# Patient Record
Sex: FEMALE | Race: WHITE | Employment: UNEMPLOYED | RURAL
[De-identification: names, ages, dates, MRNs, and addresses within clinical notes are randomized per-mention and may not be internally consistent; named-entity substitution may affect disease eponyms.]

---

## 2017-01-20 ENCOUNTER — DOCUMENTATION ONLY (OUTPATIENT)
Dept: FAMILY MEDICINE CLINIC | Age: 57
End: 2017-01-20

## 2017-01-20 ENCOUNTER — OFFICE VISIT (OUTPATIENT)
Dept: FAMILY MEDICINE CLINIC | Age: 57
End: 2017-01-20

## 2017-01-20 ENCOUNTER — TELEPHONE (OUTPATIENT)
Dept: FAMILY MEDICINE CLINIC | Age: 57
End: 2017-01-20

## 2017-01-20 VITALS
WEIGHT: 240 LBS | DIASTOLIC BLOOD PRESSURE: 90 MMHG | BODY MASS INDEX: 39.99 KG/M2 | HEIGHT: 65 IN | RESPIRATION RATE: 16 BRPM | TEMPERATURE: 98.1 F | SYSTOLIC BLOOD PRESSURE: 140 MMHG | HEART RATE: 78 BPM | OXYGEN SATURATION: 97 %

## 2017-01-20 DIAGNOSIS — I10 ESSENTIAL HYPERTENSION: Primary | ICD-10-CM

## 2017-01-20 DIAGNOSIS — M79.675 PAIN AND SWELLING OF TOE, LEFT: ICD-10-CM

## 2017-01-20 DIAGNOSIS — M79.89 PAIN AND SWELLING OF TOE, LEFT: ICD-10-CM

## 2017-01-20 RX ORDER — TOBRAMYCIN 3 MG/ML
SOLUTION/ DROPS OPHTHALMIC
COMMUNITY
Start: 2017-01-05 | End: 2018-08-03

## 2017-01-20 RX ORDER — KETOROLAC TROMETHAMINE 5 MG/ML
SOLUTION OPHTHALMIC
COMMUNITY
Start: 2017-01-05 | End: 2018-08-03

## 2017-01-20 RX ORDER — TRAMADOL HYDROCHLORIDE 50 MG/1
TABLET ORAL
Qty: 60 TAB | Refills: 0 | OUTPATIENT
Start: 2017-01-20 | End: 2018-08-03

## 2017-01-20 RX ORDER — AMLODIPINE BESYLATE 5 MG/1
5 TABLET ORAL DAILY
Qty: 30 TAB | Refills: 5 | Status: SHIPPED | OUTPATIENT
Start: 2017-01-20 | End: 2017-06-26 | Stop reason: SDUPTHER

## 2017-01-20 RX ORDER — HYDROCODONE BITARTRATE AND ACETAMINOPHEN 5; 325 MG/1; MG/1
TABLET ORAL
COMMUNITY
Start: 2016-11-15 | End: 2017-01-20

## 2017-01-20 RX ORDER — FLUOCINONIDE 0.5 MG/G
OINTMENT TOPICAL
COMMUNITY
Start: 2017-01-18 | End: 2018-08-03

## 2017-01-20 RX ORDER — LISINOPRIL AND HYDROCHLOROTHIAZIDE 12.5; 2 MG/1; MG/1
1 TABLET ORAL DAILY
Qty: 30 TAB | Refills: 5 | Status: SHIPPED | OUTPATIENT
Start: 2017-01-20 | End: 2017-07-26 | Stop reason: SDUPTHER

## 2017-01-20 RX ORDER — BETAMETHASONE DIPROPIONATE 0.5 MG/G
OINTMENT TOPICAL
COMMUNITY
Start: 2016-10-19 | End: 2019-02-17

## 2017-01-20 RX ORDER — MELOXICAM 7.5 MG/1
TABLET ORAL
COMMUNITY
Start: 2016-10-13 | End: 2017-01-20

## 2017-01-20 RX ORDER — PREDNISOLONE ACETATE 10 MG/ML
SUSPENSION/ DROPS OPHTHALMIC
COMMUNITY
Start: 2017-01-05 | End: 2018-08-03

## 2017-01-20 NOTE — PROGRESS NOTES
Chief Complaint   Patient presents with    Toe Swelling     started noticing swelling of great toe on left foot 3 weeks ago. pt states she stumped it.  Medication Refill     out of Amlodipine and Lisinopril     \"REVIEWED RECORD IN PREPARATION FOR VISIT AND HAVE OBTAINED THE NECESSARY DOCUMENTATION\"  1. Have you been to the ER, urgent care clinic since your last visit? Hospitalized since your last visit? No    2. Have you seen or consulted any other health care providers outside of the [address] [place] [address] since your last visit? Include any pap smears or colon screening. No  Patient does not have advanced directives.

## 2017-01-20 NOTE — PROGRESS NOTES
Subjective:     Jossue Martinez is a 61 y.o. female who presents for follow up of hypertension and medication refill. She reports that she has been off her medicine for the last 10 days. Hypertension:   - Diet and Lifestyle: generally follows a low sodium diet, exercises sporadically, nonsmoker  - Home BP Monitoring: is not measured at home    Cardiovascular ROS: no TIA's, no chest pain on exertion, no dyspnea on exertion, no swelling of ankles, no orthostatic dizziness or lightheadedness, no orthopnea or paroxysmal nocturnal dyspnea. New concerns:   - Tripped over carpet and stumped her left great toe 3 weeks ago. Associated with steady throbbing pain and swelling. Current Outpatient Prescriptions   Medication Sig Dispense Refill    augmented betamethasone dipropionate (DIPROLENE-AF) 0.05 % ointment       fluocinoNIDE (LIDEX) 0.05 % ointment       ketorolac (ACULAR) 0.5 % ophthalmic solution       prednisoLONE acetate (PRED FORTE) 1 % ophthalmic suspension       tobramycin (TOBREX) 0.3 % ophthalmic solution       triamcinolone acetonide (KENALOG) 0.1 % topical cream       mupirocin (BACTROBAN) 2 % ointment       ketoconazole (NIZORAL) 2 % shampoo Apply to wet scalp, lather, leave on 3 to 5 minutes, and rinse. Indications: DANDRUFF 1 Bottle 2    albuterol (VENTOLIN HFA) 90 mcg/actuation inhaler Take 2 Puffs by inhalation as needed.  amLODIPine (NORVASC) 5 mg tablet Take 1 Tab by mouth daily. Appt needed for refills. Indications: Hypertension 30 Tab 0    lisinopril-hydroCHLOROthiazide (PRINZIDE, ZESTORETIC) 20-12.5 mg per tablet Take 1 Tab by mouth daily. Appt needed for refills.   Indications: Hypertension 30 Tab 0       No Known Allergies      Past Medical History   Diagnosis Date    Arthritis      shoulders    Asthma     Hypertension     Other ill-defined conditions(559.22)      gallbladder       Social History   Substance Use Topics    Smoking status: Former Smoker     Packs/day: 1.00     Years: 10.00     Quit date: 9/24/2014    Smokeless tobacco: Never Used      Comment: one cigarette per day when pt smokes    Alcohol use No        Review of Systems, additional:  Pertinent items are noted in HPI. Objective:     Visit Vitals    /90 (BP 1 Location: Left arm, BP Patient Position: Sitting)  Comment: out of medications    Pulse 78    Temp 98.1 °F (36.7 °C) (Oral)    Resp 16    Ht 5' 5\" (1.651 m)    Wt 240 lb (108.9 kg)    SpO2 97%    BMI 39.94 kg/m2     General appearance - alert, well appearing, and in no distress  Mental status - alert, oriented to person, place, and time, normal mood, behavior, speech, dress, motor activity, and thought processes  Eyes - pupils equal and reactive, extraocular eye movements intact, sclera anicteric  Neck - supple, no significant adenopathy  Chest - clear to auscultation, no wheezes, rales or rhonchi, symmetric air entry, no tachypnea, retractions or cyanosis  Heart - normal rate, regular rhythm, normal S1, S2, no murmurs, rubs, clicks or gallops  Neurological - alert, oriented, normal speech, no focal findings or movement disorder noted   Feet - left great toe swollen with tenderness, nailbed not examined due to nail polish, pain with movement of the left toe     Assessment/Plan:   Carolina Stallings is a 61 y.o. female seen today for:     1. Essential hypertension: okay today, continue with current therapy. Medications refilled. - amLODIPine (NORVASC) 5 mg tablet; Take 1 Tab by mouth daily. Indications: Hypertension  Dispense: 30 Tab; Refill: 5  - lisinopril-hydroCHLOROthiazide (PRINZIDE, ZESTORETIC) 20-12.5 mg per tablet; Take 1 Tab by mouth daily. Indications: Hypertension  Dispense: 30 Tab; Refill: 5    2. Pain and swelling of toe, left: s/p injury, will check XR for fracture. - XR GREAT TOE LT MIN 2 V; Future  - traMADol (ULTRAM) 50 mg tablet; Take 1-2 tablets daily as needed for pain. Dispense: 60 Tab;  Refill: 0    I have discussed the diagnosis with the patient and the intended plan as seen in the above orders. The patient has received an after-visit summary and questions were answered concerning future plans. I have discussed medication side effects and warnings with the patient as well. Patient verbalizes understanding of plan of care and denies further questions or concerns at this time. Informed patient to return to the office if symptoms worsen or if new symptoms arise. Follow-up Disposition:  Return in about 6 months (around 7/20/2017) for blood pressure check.

## 2017-01-20 NOTE — MR AVS SNAPSHOT
Visit Information Date & Time Provider Department Dept. Phone Encounter #  
 1/20/2017  9:30 AM Fozia PonceJose [phone] [id number] Follow-up Instructions Return in about 6 months (around 7/20/2017) for blood pressure check. Upcoming Health Maintenance Date Due Hepatitis C Screening 1960 DTaP/Tdap/Td series (1 - Tdap) 8/4/1980 FOBT Q 1 YEAR AGE 50-75 8/4/2009 BREAST CANCER SCRN MAMMOGRAM 1/13/2018 PAP AKA CERVICAL CYTOLOGY 1/13/2019 Allergies as of 1/20/2017  Review Complete On: 1/20/2017 By: Fozia Ponce MD  
 No Known Allergies Current Immunizations  Never Reviewed No immunizations on file. Not reviewed this visit You Were Diagnosed With   
  
 Codes Comments Essential hypertension    -  Primary ICD-10-CM: I10 
ICD-9-CM: 401.9 Pain and swelling of toe, left     ICD-10-CM: M79.662, M79.89 ICD-9-CM: 729.5 Vitals BP Pulse Temp Resp Height(growth percentile) Weight(growth percentile) 140/90 (BP 1 Location: Left arm, BP Patient Position: Sitting) 78 98.1 °F (36.7 °C) (Oral) 16 5' 5\" (1.651 m) 240 lb (108.9 kg) SpO2 BMI OB Status Smoking Status 97% 39.94 kg/m2 Postmenopausal Former Smoker Vitals History BMI and BSA Data Body Mass Index Body Surface Area  
 39.94 kg/m 2 2.23 m 2 [facility]erred [facility] [address] [facility], [facility] [phone] Your Updated Medication List  
  
   
This list is accurate as of: 1/20/17 10:07 AM.  Always use your most recent med list. amLODIPine 5 mg tablet Commonly known as:  Kalpesh Montalvo Take 1 Tab by mouth daily. Indications: Hypertension  
  
 augmented betamethasone dipropionate 0.05 % ointment Commonly known as:  DIPROLENE-AF  
  
 fluocinoNIDE 0.05 % ointment Commonly known as:  LIDEX  
  
 ketoconazole 2 % shampoo Commonly known as:  NIZORAL Apply to wet scalp, lather, leave on 3 to 5 minutes, and rinse. Indications: DANDRUFF  
  
 ketorolac 0.5 % ophthalmic solution Commonly known as:  ACULAR  
  
 lisinopril-hydroCHLOROthiazide 20-12.5 mg per tablet Commonly known as:  Song Decent Take 1 Tab by mouth daily. Indications: Hypertension  
  
 mupirocin 2 % ointment Commonly known as:  BACTROBAN  
  
 prednisoLONE acetate 1 % ophthalmic suspension Commonly known as:  PRED FORTE  
  
 tobramycin 0.3 % ophthalmic solution Commonly known as:  TOBREX  
  
 triamcinolone acetonide 0.1 % topical cream  
Commonly known as:  KENALOG  
  
 VENTOLIN HFA 90 mcg/actuation inhaler Generic drug:  albuterol Take 2 Puffs by inhalation as needed. Prescriptions Sent to Pharmacy Refills  
 amLODIPine (NORVASC) 5 mg tablet 5 Sig: Take 1 Tab by mouth daily. Indications: Hypertension Class: Normal  
 Pharmacy: [address] [place] [address], [address] [place] [address] Ph #: [phone] Route: Oral  
 lisinopril-hydroCHLOROthiazide (PRINZIDE, ZESTORETIC) 20-12.5 mg per tablet 5 Sig: Take 1 Tab by mouth daily. Indications: Hypertension Class: Normal  
 Pharmacy: [address] [place] [address], [address] [place] [address] Ph #: [phone] Route: Oral  
  
Follow-up Instructions Return in about 6 months (around 7/20/2017) for blood pressure check. To-Do List   
 01/20/2017 Imaging:  XR GREAT TOE LT MIN 2 V Patient Instructions A Healthy Lifestyle: Care Instructions Your Care Instructions A healthy lifestyle can help you feel good, stay at a healthy weight, and have plenty of energy for both work and play. A healthy lifestyle is something you can share with your whole family. A healthy lifestyle also can lower your risk for serious health problems, such as high blood pressure, heart disease, and diabetes. You can follow a few steps listed below to improve your health and the health of your family. Follow-up care is a key part of your treatment and safety. Be sure to make and go to all appointments, and call your doctor if you are having problems. Its also a good idea to know your test results and keep a list of the medicines you take. How can you care for yourself at home? · Do not eat too much sugar, fat, or fast foods. You can still have dessert and treats now and then. The goal is moderation. · Start small to improve your eating habits. Pay attention to portion sizes, drink less juice and soda pop, and eat more fruits and vegetables. ¨ Eat a healthy amount of food. A 3-ounce serving of meat, for example, is about the size of a deck of cards. Fill the rest of your plate with vegetables and whole grains. ¨ Limit the amount of soda and sports drinks you have every day. Drink more water when you are thirsty. ¨ Eat at least 5 servings of fruits and vegetables every day. It may seem like a lot, but it is not hard to reach this goal. A serving or helping is 1 piece of fruit, 1 cup of vegetables, or 2 cups of leafy, raw vegetables. Have an apple or some carrot sticks as an afternoon snack instead of a candy bar. Try to have fruits and/or vegetables at every meal. 
· Make exercise part of your daily routine. You may want to start with simple activities, such as walking, bicycling, or slow swimming. Try to be active 30 to 60 minutes every day. You do not need to do all 30 to 60 minutes all at once. For example, you can exercise 3 times a day for 10 or 20 minutes. Moderate exercise is safe for most people, but it is always a good idea to talk to your doctor before starting an exercise program. 
· Keep moving. Cesar Osborne the lawn, work in the garden, or [employer]. Take the stairs instead of the elevator at work. · If you smoke, quit.  People who smoke have an increased risk for heart attack, stroke, cancer, and other lung illnesses. Quitting is hard, but there are ways to boost your chance of quitting tobacco for good. ¨ Use nicotine gum, patches, or lozenges. ¨ Ask your doctor about stop-smoking programs and medicines. ¨ Keep trying. In addition to reducing your risk of diseases in the future, you will notice some benefits soon after you stop using tobacco. If you have shortness of breath or asthma symptoms, they will likely get better within a few weeks after you quit. · Limit how much alcohol you drink. Moderate amounts of alcohol (up to 2 drinks a day for men, 1 drink a day for women) are okay. But drinking too much can lead to liver problems, high blood pressure, and other health problems. Family health If you have a family, there are many things you can do together to improve your health. · Eat meals together as a family as often as possible. · Eat healthy foods. This includes fruits, vegetables, lean meats and dairy, and whole grains. · Include your family in your fitness plan. Most people think of activities such as jogging or tennis as the way to fitness, but there are many ways you and your family can be more active. Anything that makes you breathe hard and gets your heart pumping is exercise. Here are some tips: 
¨ Walk to do errands or to take your child to school or the bus. ¨ Go for a family bike ride after dinner instead of watching TV. Where can you learn more? Go to http://vivi-victor m.info/. Enter C336 in the search box to learn more about \"A Healthy Lifestyle: Care Instructions. \" Current as of: July 26, 2016 Content Version: 11.1 © 7395-5566 [employer]. Care instructions adapted under license by new test company (which disclaims liability or warranty for this information).  If you have questions about a medical condition or this instruction, always ask your healthcare professional. [employer] disclaims any warranty or liability for your use of this information. Introducing \[facility]\"" & HEALTH SERVICES! [employer] introduces "[employer] patient portal. Now you can access parts of your medical record, email your doctor's office, and request medication refills online. 1. In your internet browser, go to [email or web address] 2. Click on the First Time User? Click Here link in the Sign In box. You will see the New Member Sign Up page. 3. Enter your "[employer] Access Code exactly as it appears below. You will not need to use this code after youve completed the sign-up process. If you do not sign up before the expiration date, you must request a new code. · "[employer] Access Code: [email or web address] Expires: 4/20/2017 10:07 AM 
 
4. Enter the last four digits of your Social Security Number (xxxx) and Date of Birth (mm/dd/yyyy) as indicated and click Submit. You will be taken to the next sign-up page. 5. Create a "[employer] ID. This will be your "[employer] login ID and cannot be changed, so think of one that is secure and easy to remember. 6. Create a "[employer] password. You can change your password at any time. 7. Enter your Password Reset Question and Answer. This can be used at a later time if you forget your password. 8. Enter your e-mail address. You will receive e-mail notification when new information is available in [address] 9. Click Sign Up. You can now view and download portions of your medical record. 10. Click the Download Summary menu link to download a portable copy of your medical information. If you have questions, please visit the Frequently Asked Questions section of the "[employer] website. Remember, "[employer] is NOT to be used for urgent needs. For medical emergencies, dial 911. Now available from your iPhone and Android! Please provide this summary of care documentation to your next provider. Your primary care clinician is listed as Phys Other. If you have any questions after today's visit, please call [phone].

## 2017-01-20 NOTE — TELEPHONE ENCOUNTER
Called to pharmacist at [facility]    Tramadol 50mg tab    Take 1-2 tabs by mouth daily as needed. #60/0 per Dr. Torey Franco verbal order with read back. Pharmacist stated she will notify pt that Rx is ready.

## 2017-01-20 NOTE — TELEPHONE ENCOUNTER
Pt stated pharmacy is not seeing medications ordered this morning - [facility] - Caitlin CHAHAL

## 2017-01-20 NOTE — PATIENT INSTRUCTIONS

## 2017-07-06 ENCOUNTER — TELEPHONE (OUTPATIENT)
Dept: FAMILY MEDICINE CLINIC | Age: 57
End: 2017-07-06

## 2018-08-03 ENCOUNTER — HOSPITAL ENCOUNTER (EMERGENCY)
Age: 58
Discharge: HOME | End: 2018-08-03
Attending: EMERGENCY MEDICINE
Payer: MEDICARE

## 2018-08-03 VITALS
RESPIRATION RATE: 16 BRPM | DIASTOLIC BLOOD PRESSURE: 80 MMHG | HEART RATE: 92 BPM | OXYGEN SATURATION: 98 % | BODY MASS INDEX: 35.36 KG/M2 | TEMPERATURE: 98.2 F | HEIGHT: 66 IN | WEIGHT: 220 LBS | SYSTOLIC BLOOD PRESSURE: 168 MMHG
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
## 2018-08-03 DIAGNOSIS — S39.012A BACK STRAIN, INITIAL ENCOUNTER: ICD-10-CM

## 2018-08-03 PROCEDURE — 74011250637 HC RX REV CODE- 250/637: Performed by: EMERGENCY MEDICINE

## 2018-08-03 PROCEDURE — 99283 EMERGENCY DEPT VISIT LOW MDM: CPT

## 2018-08-03 RX ORDER — IBUPROFEN 800 MG/1
800 TABLET ORAL
Status: COMPLETED | OUTPATIENT
Start: 2018-08-03 | End: 2018-08-03

## 2018-08-03 RX ORDER — IBUPROFEN 800 MG/1
800 TABLET ORAL
Qty: 20 TAB | Refills: 0 | Status: SHIPPED | OUTPATIENT
Start: 2018-08-03 | End: 2018-08-10

## 2018-08-03 RX ADMIN — IBUPROFEN 800 MG: 800 TABLET ORAL at 19:07

## 2018-08-03 NOTE — ED NOTES
The patient was discharged home by Dr. Jocelynn Win and Viv Allen rn in stable condition, accompanied by family. The patient is alert and oriented, is in no respiratory distress and has vital signs within normal limits . The patient's diagnosis, condition and treatment were explained to patient. The patient expressed understanding. Prescriptions given to pt. No work/school note given to pt. A discharge plan has been developed. A  was not involved in the process. Aftercare instructions were given to the patient. Family will transport pt home.
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
## 2018-11-12 ENCOUNTER — HOSPITAL ENCOUNTER (OUTPATIENT)
Dept: LAB | Age: 58
Discharge: HOME | End: 2018-11-12
Payer: MEDICARE

## 2018-11-12 ENCOUNTER — OFFICE VISIT (OUTPATIENT)
Dept: FAMILY MEDICINE CLINIC | Age: 58
End: 2018-11-12

## 2018-11-12 VITALS
DIASTOLIC BLOOD PRESSURE: 101 MMHG | OXYGEN SATURATION: 99 % | HEIGHT: 66 IN | WEIGHT: 250 LBS | RESPIRATION RATE: 20 BRPM | HEART RATE: 92 BPM | BODY MASS INDEX: 40.18 KG/M2 | SYSTOLIC BLOOD PRESSURE: 141 MMHG | TEMPERATURE: 98.4 F

## 2018-11-12 DIAGNOSIS — M17.0 PRIMARY OSTEOARTHRITIS OF BOTH KNEES: ICD-10-CM

## 2018-11-12 DIAGNOSIS — Z29.9 ENCOUNTER FOR PREVENTIVE MEASURE: ICD-10-CM

## 2018-11-12 DIAGNOSIS — E66.01 MORBID OBESITY DUE TO EXCESS CALORIES (HCC): ICD-10-CM

## 2018-11-12 DIAGNOSIS — Z23 ENCOUNTER FOR IMMUNIZATION: ICD-10-CM

## 2018-11-12 DIAGNOSIS — Z83.3 FAMILY HISTORY OF DIABETES MELLITUS: ICD-10-CM

## 2018-11-12 DIAGNOSIS — I10 ESSENTIAL HYPERTENSION: Primary | ICD-10-CM

## 2018-11-12 PROCEDURE — 83036 HEMOGLOBIN GLYCOSYLATED A1C: CPT

## 2018-11-12 PROCEDURE — 80061 LIPID PANEL: CPT

## 2018-11-12 PROCEDURE — 36415 COLL VENOUS BLD VENIPUNCTURE: CPT

## 2018-11-12 PROCEDURE — 85025 COMPLETE CBC W/AUTO DIFF WBC: CPT

## 2018-11-12 PROCEDURE — 80053 COMPREHEN METABOLIC PANEL: CPT

## 2018-11-12 RX ORDER — LISINOPRIL AND HYDROCHLOROTHIAZIDE 12.5; 2 MG/1; MG/1
TABLET ORAL
Qty: 30 TAB | Refills: 5 | Status: SHIPPED | OUTPATIENT
Start: 2018-11-12 | End: 2019-03-15 | Stop reason: SDUPTHER

## 2018-11-12 RX ORDER — DICLOFENAC SODIUM 10 MG/G
2 GEL TOPICAL 4 TIMES DAILY
Qty: 1 EACH | Refills: 3 | Status: SHIPPED | OUTPATIENT
Start: 2018-11-12 | End: 2019-03-15 | Stop reason: SDUPTHER

## 2018-11-12 RX ORDER — MELOXICAM 15 MG/1
15 TABLET ORAL DAILY
Qty: 30 TAB | Refills: 3 | Status: SHIPPED | OUTPATIENT
Start: 2018-11-12

## 2018-11-12 NOTE — LETTER
11/13/2018 1:16 PM 
 
[facility]. [id number] [facility] [address]  [id number] [place] [id number] [facility] [address]: 
 
Please find your most recent results below. Resulted Orders CBC WITH AUTOMATED DIFF Result Value Ref Range WBC 6.4 3.4 - 10.8 x10E3/uL  
 RBC 5.38 (H) 3.77 - 5.28 x10E6/uL HGB 11.7 11.1 - 15.9 g/dL HCT 37.7 34.0 - 46.6 % MCV 70 (L) 79 - 97 fL  
 MCH 21.7 (L) 26.6 - 33.0 pg  
 MCHC 31.0 (L) 31.5 - 35.7 g/dL  
 RDW 18.3 (H) 12.3 - 15.4 % PLATELET 677 653 - 256 x10E3/uL NEUTROPHILS 65 Not Estab. % Lymphocytes 23 Not Estab. % MONOCYTES 9 Not Estab. % EOSINOPHILS 2 Not Estab. % BASOPHILS 1 Not Estab. %  
 ABS. NEUTROPHILS 4.2 1.4 - 7.0 x10E3/uL Abs Lymphocytes 1.5 0.7 - 3.1 x10E3/uL  
 ABS. MONOCYTES 0.6 0.1 - 0.9 x10E3/uL  
 ABS. EOSINOPHILS 0.1 0.0 - 0.4 x10E3/uL  
 ABS. BASOPHILS 0.0 0.0 - 0.2 x10E3/uL IMMATURE GRANULOCYTES 0 Not Estab. %  
 ABS. IMM. GRANS. 0.0 0.0 - 0.1 x10E3/uL Narrative Performed at:  [address]  [id number] : Patricia Lindsay MD, Phone:  [phone] LIPID PANEL Result Value Ref Range Cholesterol, total 139 100 - 199 mg/dL Triglyceride 103 0 - 149 mg/dL HDL Cholesterol 48 >39 mg/dL VLDL, calculated 21 5 - 40 mg/dL LDL, calculated 70 0 - 99 mg/dL Narrative Performed at:  [address]  [id number] : Patricia Lindsay MD, Phone:  [phone] METABOLIC PANEL, COMPREHENSIVE Result Value Ref Range Glucose 106 (H) 65 - 99 mg/dL BUN 12 6 - 24 mg/dL Creatinine 0.93 0.57 - 1.00 mg/dL GFR est non-AA 67 >59 mL/min/1.73 GFR est AA 78 >59 mL/min/1.73  
 BUN/Creatinine ratio 13 9 - 23 Sodium 142 134 - 144 mmol/L Potassium 4.3 3.5 - 5.2 mmol/L Chloride 101 96 - 106 mmol/L  
 CO2 22 20 - 29 mmol/L Calcium 9.3 8.7 - 10.2 mg/dL Protein, total 6.6 6.0 - 8.5 g/dL Albumin 4.4 3.5 - 5.5 g/dL GLOBULIN, TOTAL 2.2 1.5 - 4.5 g/dL A-G Ratio 2.0 1.2 - 2.2 Bilirubin, total 0.2 0.0 - 1.2 mg/dL Alk. phosphatase 78 39 - 117 IU/L  
 AST (SGOT) 23 0 - 40 IU/L  
 ALT (SGPT) 19 0 - 32 IU/L Narrative Performed at:  [address]  [id number] : Will Alex MD, Phone:  [phone] HEMOGLOBIN A1C WITH EAG Result Value Ref Range Hemoglobin A1c 6.0 (H) 4.8 - 5.6 % Comment:  
            Prediabetes: 5.7 - 6.4 Diabetes: >6.4 Glycemic control for adults with diabetes: <7.0 Estimated average glucose 126 mg/dL Narrative Performed at:  [address]  [id number] : Will Alex MD, Phone:  [phone] CVD REPORT Result Value Ref Range INTERPRETATION Note Comment:  
   Supplemental report is available. Narrative Performed at:  [address] [place] [address]  [id number] : Sis Chiu MD, Phone:  [phone] RECOMMENDATIONS: 
Your labwork looks good, except your A1c (used to screen for diabetes) is a little elevated. It is 6.0, which means you have PRE-DIABETES. So, I recommend working on healthy eating habits as you have started already, with a diet low in carbohydrates and sugar. Also, exercise at least 30min for 5 days per week. Otherwise, keep up the good work and we'll see you at your next visit! Please call me if you have any questions: [phone] Sincerely, 
 
 
Reyna Mendez MD

## 2018-11-12 NOTE — PROGRESS NOTES
[address]    Subjective:   Didi Cannon is a 60 y.o. female with history of HTN, sarcoidosis of skin in remission  CC: knee pain and med refills  History provided by patient     HPI:    Patient is new to this clinic, but used to see Dr. Jorje Davis at [facility]. Last appointment was almost 2 years ago. HTN  Patient presents to clinic for f/u on chronic conditions. She has a hx of HTN and used to be on Prinzide and Amlodipine. However, her father  a couple of years ago and while going through this tragedy, she stopped taking all her medications. She is also not checking her BP at home. She endorses occasional H/A and believes they're due to uncontrolled high blood pressure. Otherwise, pt denies fever, chills, chest pain, palpitations, N/V, abdominal pain, or dysuria. OA of knees  Patient with b/l knee OA which causes moderate to severe pain. Achy pain is very bothersome in the evenings. \"it's like a toothache at night,\" she declares. She reports trying Tylenol PM and other OTC pain medications in the past, without much relief. She states that a couple of years back, she was placed on Tramadol, which seems to be effective. She has not seen a PCP in over a year now, and has occasionally been using her uncle's Tramadol pills. Moreover, she sees orthopedics, Dr. Nisha Chiu, and received gel intraarticular injection every 3 months (last injection ~ 3 months ago). Obesity   Patient has been struggling with weight loss these past few years. She reports that at some point, she succeeded in losing some weight, but after the death of her father she has been gaining a lot more. A couple of weeks ago, she decided to start Weight Watchers programs. She affirms that it is a lot harder to exercise due to joint pain. Social Hx  Patient currently does not smoke, but past smoker with 10 year pack history. She denies alcohol or illicit drug use.      Preventive measure  Mammogram: last one in 2016, denies abnormal result in the past.  Colonoscopy: Never had one  PAP smear: Denies abnormal results in the past. She is due for one. DTaP: She has not had the vaccine yet. Family hx of diabetes   DM2: Father   HTN: Mother  Cancer: Father and paternal brother and grand father with colon cancer  CVA: maternal relatives    Current Outpatient Medications on File Prior to Visit   Medication Sig Dispense Refill    augmented betamethasone dipropionate (DIPROLENE-AF) 0.05 % ointment       ketoconazole (NIZORAL) 2 % shampoo Apply to wet scalp, lather, leave on 3 to 5 minutes, and rinse. Indications: DANDRUFF 1 Bottle 2     No current facility-administered medications on file prior to visit. Patient Active Problem List   Diagnosis Code    S/P laparoscopic cholecystectomy Z90.49    Essential hypertension I10    Sarcoidosis of skin D86.3       Social History     Socioeconomic History    Marital status: UNKNOWN     Spouse name: Not on file    Number of children: Not on file    Years of education: Not on file    Highest education level: Not on file   Social Needs    Financial resource strain: Not on file    Food insecurity - worry: Not on file    Food insecurity - inability: Not on file    Transportation needs - medical: Not on file   [employer] needs - non-medical: Not on file   Occupational History    Not on file   Tobacco Use    Smoking status: Former Smoker     Packs/day: 1.00     Years: 10.00     Pack years: 10.00     Last attempt to quit: 2014     Years since quittin.1    Smokeless tobacco: Never Used    Tobacco comment: one cigarette per day when pt smokes   Substance and Sexual Activity    Alcohol use: No     Alcohol/week: 0.0 oz    Drug use: No    Sexual activity: Not on file   Other Topics Concern    Not on file   Social History Narrative    Not on file       Review of Systems   Constitutional: Negative for chills and fever.    HENT: Negative for congestion. Respiratory: Negative for cough. Cardiovascular: Negative for chest pain and palpitations. Gastrointestinal: Negative for abdominal pain, diarrhea, nausea and vomiting. Genitourinary: Negative for dysuria. Musculoskeletal: Positive for joint pain (knees). Negative for falls. Skin: Negative for rash. Neurological: Positive for headaches. Negative for dizziness. Psychiatric/Behavioral: Negative for substance abuse. Objective:     Visit Vitals  BP (!) 141/101 (BP 1 Location: Left arm, BP Patient Position: Sitting)   Pulse 92   Temp 98.4 °F (36.9 °C) (Oral)   Resp 20   Ht 5' 6\" (1.676 m)   Wt 250 lb (113.4 kg)   SpO2 99%   BMI 40.35 kg/m²        Physical Exam   Constitutional: She is oriented to person, place, and time. Pleasant, obese, in NAD   HENT:   Head: Normocephalic and atraumatic. Eyes: Conjunctivae and EOM are normal. Pupils are equal, round, and reactive to light. Neck: Normal range of motion. Cardiovascular: Normal rate, regular rhythm and normal heart sounds. No murmur heard. Pulmonary/Chest: Effort normal and breath sounds normal. No respiratory distress. She has no wheezes. Abdominal: Soft. Bowel sounds are normal. She exhibits no distension. There is no tenderness. Musculoskeletal: Normal range of motion. She exhibits no edema. Neurological: She is alert and oriented to person, place, and time. Skin: Skin is warm. She is not diaphoretic. Psychiatric: She has a normal mood and affect. Her behavior is normal. Thought content normal.       Pertinent Labs/Studies: N/A      Assessment and orders:     Pt is 57yro F who presents for management of HTN and knee pain    ICD-10-CM ICD-9-CM    1. Essential hypertension J83 090.6 METABOLIC PANEL, COMPREHENSIVE      lisinopril-hydroCHLOROthiazide (PRINZIDE, ZESTORETIC) 20-12.5 mg per tablet   2.  Primary osteoarthritis of both knees M17.0 715.16 diclofenac (VOLTAREN) 1 % gel      meloxicam (MOBIC) 15 mg tablet 3. Encounter for immunization Z23 V03.89 INFLUENZA VIRUS VAC QUAD,SPLIT,PRESV FREE SYRINGE IM   4. Family history of diabetes mellitus Z83.3 V18.0 HEMOGLOBIN A1C WITH EAG   5. Encounter for preventive measure Z29.9 V07.9 CBC WITH AUTOMATED DIFF      LIPID PANEL   6. Morbid obesity due to excess calories ([facility]) E66.01 278.01 LIPID PANEL     Diagnoses and all orders for this visit:    1. Essential hypertension  Patient used to be on Prinzide and Amlodipine a couple of years ago, but has not seen PCP since then and stopped medications. Will restart Prinzide 20 daily and f/u in 2 weeks for BP recheck. Recommended low salt diet and regular BP check at home, once a day. -     METABOLIC PANEL, COMPREHENSIVE  -     lisinopril-hydroCHLOROthiazide (PRINZIDE, ZESTORETIC) 20-12.5 mg per tablet; TAKE ONE TABLET BY MOUTH EVERY DAY FOR HYPERTENSION    2. Primary osteoarthritis of both knees  Will start Mobic and apply Voltaren cream on affected areas. Consider Celebrex(need Prior Auth) if Mobix ineffective. -     diclofenac (VOLTAREN) 1 % gel; Apply 2 g to affected area four (4) times daily. -     meloxicam (MOBIC) 15 mg tablet; Take 1 Tab by mouth daily. 3. Encounter for immunization  -     INFLUENZA VIRUS VAC QUAD,SPLIT,PRESV FREE SYRINGE IM    4. Family history of diabetes mellitus  -     HEMOGLOBIN A1C WITH EAG    5. Encounter for preventive measure  Will work on [employer] and preventive measure such as mammogram, colonoscopy, etc... at next visit. Will also f/u on labwork. -     CBC WITH AUTOMATED DIFF  -     LIPID PANEL    6. Morbid obesity due to excess calories [facility])  Patient is currently working on weight loss. Encouraged diet low in carbohydrates and moderate exercises of 30min 5days/week. -     LIPID PANEL      Follow-up Disposition:  Return in about 2 weeks (around 11/26/2018) for follow-up, Medicare wellness, and preventive measure.     I have reviewed patient medical and social history and medications. I have reviewed pertinent labs results and other data. I have discussed the diagnosis with the patient and the intended plan as seen in the above orders. The patient has received an after-visit summary and questions were answered concerning future plans. I have discussed medication side effects and warnings with the patient as well.     Walter Donovan MD  Resident [facility]  11/12/18

## 2018-11-13 LAB
ALBUMIN SERPL-MCNC: 4.4 G/DL (ref 3.5–5.5)
ALBUMIN/GLOB SERPL: 2 {RATIO} (ref 1.2–2.2)
ALP SERPL-CCNC: 78 IU/L (ref 39–117)
ALT SERPL-CCNC: 19 IU/L (ref 0–32)
AST SERPL-CCNC: 23 IU/L (ref 0–40)
BASOPHILS # BLD AUTO: 0 X10E3/UL (ref 0–0.2)
BASOPHILS NFR BLD AUTO: 1 %
BILIRUB SERPL-MCNC: 0.2 MG/DL (ref 0–1.2)
BUN SERPL-MCNC: 12 MG/DL (ref 6–24)
BUN/CREAT SERPL: 13 (ref 9–23)
CALCIUM SERPL-MCNC: 9.3 MG/DL (ref 8.7–10.2)
CHLORIDE SERPL-SCNC: 101 MMOL/L (ref 96–106)
CHOLEST SERPL-MCNC: 139 MG/DL (ref 100–199)
CO2 SERPL-SCNC: 22 MMOL/L (ref 20–29)
CREAT SERPL-MCNC: 0.93 MG/DL (ref 0.57–1)
EOSINOPHIL # BLD AUTO: 0.1 X10E3/UL (ref 0–0.4)
EOSINOPHIL NFR BLD AUTO: 2 %
ERYTHROCYTE [DISTWIDTH] IN BLOOD BY AUTOMATED COUNT: 18.3 % (ref 12.3–15.4)
EST. AVERAGE GLUCOSE BLD GHB EST-MCNC: 126 MG/DL
GLOBULIN SER CALC-MCNC: 2.2 G/DL (ref 1.5–4.5)
GLUCOSE SERPL-MCNC: 106 MG/DL (ref 65–99)
HBA1C MFR BLD: 6 % (ref 4.8–5.6)
HCT VFR BLD AUTO: 37.7 % (ref 34–46.6)
HDLC SERPL-MCNC: 48 MG/DL
HGB BLD-MCNC: 11.7 G/DL (ref 11.1–15.9)
IMM GRANULOCYTES # BLD: 0 X10E3/UL (ref 0–0.1)
IMM GRANULOCYTES NFR BLD: 0 %
INTERPRETATION, 910389: NORMAL
LDLC SERPL CALC-MCNC: 70 MG/DL (ref 0–99)
LYMPHOCYTES # BLD AUTO: 1.5 X10E3/UL (ref 0.7–3.1)
LYMPHOCYTES NFR BLD AUTO: 23 %
MCH RBC QN AUTO: 21.7 PG (ref 26.6–33)
MCHC RBC AUTO-ENTMCNC: 31 G/DL (ref 31.5–35.7)
MCV RBC AUTO: 70 FL (ref 79–97)
MONOCYTES # BLD AUTO: 0.6 X10E3/UL (ref 0.1–0.9)
MONOCYTES NFR BLD AUTO: 9 %
NEUTROPHILS # BLD AUTO: 4.2 X10E3/UL (ref 1.4–7)
NEUTROPHILS NFR BLD AUTO: 65 %
PLATELET # BLD AUTO: 274 X10E3/UL (ref 150–379)
POTASSIUM SERPL-SCNC: 4.3 MMOL/L (ref 3.5–5.2)
PROT SERPL-MCNC: 6.6 G/DL (ref 6–8.5)
RBC # BLD AUTO: 5.38 X10E6/UL (ref 3.77–5.28)
SODIUM SERPL-SCNC: 142 MMOL/L (ref 134–144)
TRIGL SERPL-MCNC: 103 MG/DL (ref 0–149)
VLDLC SERPL CALC-MCNC: 21 MG/DL (ref 5–40)
WBC # BLD AUTO: 6.4 X10E3/UL (ref 3.4–10.8)

## 2018-11-13 NOTE — PROGRESS NOTES
Patient is pre-diabetic with A1c at 6.0, but otherwise unremarkable labwork. Will send result letter.

## 2018-11-26 ENCOUNTER — OFFICE VISIT (OUTPATIENT)
Dept: FAMILY MEDICINE CLINIC | Age: 58
End: 2018-11-26

## 2018-11-26 VITALS
TEMPERATURE: 98.2 F | BODY MASS INDEX: 40.35 KG/M2 | DIASTOLIC BLOOD PRESSURE: 119 MMHG | RESPIRATION RATE: 20 BRPM | SYSTOLIC BLOOD PRESSURE: 145 MMHG | HEART RATE: 77 BPM | OXYGEN SATURATION: 99 % | HEIGHT: 66 IN

## 2018-11-26 DIAGNOSIS — I10 ESSENTIAL HYPERTENSION: Primary | ICD-10-CM

## 2018-11-26 DIAGNOSIS — M19.072 OSTEOARTHRITIS OF BOTH ANKLES, UNSPECIFIED OSTEOARTHRITIS TYPE: ICD-10-CM

## 2018-11-26 DIAGNOSIS — E66.01 MORBID (SEVERE) OBESITY DUE TO EXCESS CALORIES (HCC): ICD-10-CM

## 2018-11-26 DIAGNOSIS — M19.071 OSTEOARTHRITIS OF BOTH ANKLES, UNSPECIFIED OSTEOARTHRITIS TYPE: ICD-10-CM

## 2018-11-26 RX ORDER — TRAMADOL HYDROCHLORIDE 50 MG/1
50 TABLET ORAL
Qty: 10 TAB | Refills: 0 | Status: SHIPPED | OUTPATIENT
Start: 2018-11-26 | End: 2018-12-17

## 2018-11-26 RX ORDER — AMLODIPINE BESYLATE 5 MG/1
5 TABLET ORAL DAILY
Qty: 30 TAB | Refills: 3 | Status: SHIPPED | OUTPATIENT
Start: 2018-11-26 | End: 2019-03-15 | Stop reason: SDUPTHER

## 2018-11-26 NOTE — PROGRESS NOTES
Chief Complaint   Patient presents with    Hypertension     follow up on BP     1. Have you been to the ER, urgent care clinic since your last visit? Hospitalized since your last visit? No    2. Have you seen or consulted any other health care providers outside of the [address] [place] [address] since your last visit? Include any pap smears or colon screening. No     Reviewed record in preparation for visit and have obtained necessary documentation.

## 2018-11-26 NOTE — PROGRESS NOTES
[address]    Subjective:   Didi Cannon is a 60 y.o. female with history of HTN, Knee OA, sarcoidosis of skin in remission  CC: F/u on HTN  History provided by patient     HPI:    HTN  Patient presents to clinic for f/u on BP. She used to be on Amlodipine and Prinzide but stopped Rx about a year ago, due to stressful life events. When she came to office a couple of weeks ago, she was restarted on Prinzide 20 daily only and asked to check BP daily at home. Today, patient reports compliance to medication but states that she does not check her BP at home and prefers to do it when she comes to clinic. No side effects to medication noted. OA  Patient endorses b/l lower extremities, including knees and ankle pain, reported at \"11/10\" on pain scale today. She was started on Mobic 15mg daily a couple of weeks ago, but patient states medication is not working and has been taking it 2-3 times a day without any significant relief. She states that so far, only Tramadol has considerably helped her. She does not see a pain specialist, but is being followed by an orthopedics, Dr. Dwayne Evans, who gives her knee intraarticular injections every 6 months(last injection ~ 3 months ago). Obesity  She has been walking and working out almost everyday now. Her cousin is a  and they've been exercising together (sessions last ~ 3 hours) 6days/week. Moreover, she is going through [employer] and reports losing 5lbs already. Current Outpatient Medications on File Prior to Visit   Medication Sig Dispense Refill    lisinopril-hydroCHLOROthiazide (PRINZIDE, ZESTORETIC) 20-12.5 mg per tablet TAKE ONE TABLET BY MOUTH EVERY DAY FOR HYPERTENSION 30 Tab 5    diclofenac (VOLTAREN) 1 % gel Apply 2 g to affected area four (4) times daily. 1 Each 3    meloxicam (MOBIC) 15 mg tablet Take 1 Tab by mouth daily.  30 Tab 3    augmented betamethasone dipropionate (DIPROLENE-AF) 0.05 % ointment       ketoconazole (NIZORAL) 2 % shampoo Apply to wet scalp, lather, leave on 3 to 5 minutes, and rinse. Indications: DANDRUFF 1 Bottle 2     No current facility-administered medications on file prior to visit. Patient Active Problem List   Diagnosis Code    S/P laparoscopic cholecystectomy Z90.49    Essential hypertension I10    Sarcoidosis of skin D86.3       Social History     Socioeconomic History    Marital status: UNKNOWN     Spouse name: Not on file    Number of children: Not on file    Years of education: Not on file    Highest education level: Not on file   Social Needs    Financial resource strain: Not on file    Food insecurity - worry: Not on file    Food insecurity - inability: Not on file    Transportation needs - medical: Not on file   [employer] needs - non-medical: Not on file   Occupational History    Not on file   Tobacco Use    Smoking status: Former Smoker     Packs/day: 1.00     Years: 10.00     Pack years: 10.00     Last attempt to quit: 2014     Years since quittin.1    Smokeless tobacco: Never Used    Tobacco comment: one cigarette per day when pt smokes   Substance and Sexual Activity    Alcohol use: No     Alcohol/week: 0.0 oz    Drug use: No    Sexual activity: Not on file   Other Topics Concern    Not on file   Social History Narrative    Not on file       Review of Systems   Constitutional: Negative for chills and fever. HENT: Negative for congestion. Eyes: Negative for blurred vision. Respiratory: Negative for cough. Cardiovascular: Negative for chest pain. Gastrointestinal: Negative for abdominal pain, diarrhea and vomiting. Genitourinary: Negative for dysuria. Musculoskeletal: Positive for joint pain. Neurological: Negative for dizziness and headaches. Psychiatric/Behavioral: Negative for depression.          Objective:     Visit Vitals  BP (!) 145/119 (BP 1 Location: Left arm, BP Patient Position: Sitting)   Pulse 77   Temp 98.2 °F (36.8 °C) (Oral)   Resp 20   Ht 5' 6\" (1.676 m)   SpO2 99%   BMI 40.35 kg/m²        Physical Exam   Constitutional: She is oriented to person, place, and time. Morbidly obese, appears to be in mild pain but in NAD   HENT:   Head: Normocephalic and atraumatic. Neck: Normal range of motion. Cardiovascular: Normal rate, regular rhythm and normal heart sounds. Pulmonary/Chest: Effort normal and breath sounds normal. No respiratory distress. Abdominal: Soft. Bowel sounds are normal. She exhibits no distension. Musculoskeletal: Normal range of motion. She exhibits tenderness (with flexion and extension of b/l knees and ankles). She exhibits no edema. Neurological: She is alert and oriented to person, place, and time. Skin: Skin is warm. Psychiatric: She has a normal mood and affect. Her behavior is normal. Thought content normal.       Pertinent Labs/Studies: N/A      Assessment and orders:     Pt is 57yro F who presents for follow-up on chronic conditions. ICD-10-CM ICD-9-CM    1. Essential hypertension I10 401.9 amLODIPine (NORVASC) 5 mg tablet   2. Osteoarthritis of both ankles, unspecified osteoarthritis type M19.071 715.97 traMADol (ULTRAM) 50 mg tablet    M19.072     3. Morbid (severe) obesity due to excess calories ([facility] [address] E66.01 278.01      Diagnoses and all orders for this visit:    1. Essential hypertension  C/o Prinzide daily and restart Norvasc 5mg daily. Will recheck at follow-up appointment in 2 weeks. -     amLODIPine (NORVASC) 5 mg tablet; Take 1 Tab by mouth daily. 2. Osteoarthritis of both ankles, unspecified osteoarthritis type  Patient reports severe pain, not alleviated by OTC or Mobic. She has been on Tramadol in the past and reports significant improvement with medication.  reviewed and appropriate. Will refer to pain specialist, and give few Tramadol pills while setting appointment.  Explained to patient that we can't do long-term pain management with opioids in clinic, and she expressed understanding. Also advised to follow-up with Orthopedics. -     traMADol (ULTRAM) 50 mg tablet; Take 1 Tab by mouth every six (6) hours as needed for Pain. Max Daily Amount: 200 mg.    3. Morbid (severe) obesity due to excess calories ([address]  Encouraged patient on weight loss journey, counseled on regular exercise and healthy diet. Follow-up Disposition:  Return in about 2 weeks (around 12/10/2018) for follow-up. I have reviewed patient medical and social history and medications. I have reviewed pertinent labs results and other data. I have discussed the diagnosis with the patient and the intended plan as seen in the above orders. The patient has received an after-visit summary and questions were answered concerning future plans. I have discussed medication side effects and warnings with the patient as well.     Yanely Isidro MD  Resident [facility]  11/26/18

## 2018-11-26 NOTE — PATIENT INSTRUCTIONS
[facility]    Address: [phone], [place], [address]  Phone: [phone]

## 2018-12-17 ENCOUNTER — OFFICE VISIT (OUTPATIENT)
Dept: FAMILY MEDICINE CLINIC | Age: 58
End: 2018-12-17

## 2018-12-17 DIAGNOSIS — I10 ESSENTIAL HYPERTENSION: Primary | ICD-10-CM

## 2018-12-17 DIAGNOSIS — E66.01 MORBID (SEVERE) OBESITY DUE TO EXCESS CALORIES (HCC): ICD-10-CM

## 2018-12-17 DIAGNOSIS — M17.0 PRIMARY OSTEOARTHRITIS OF BOTH KNEES: ICD-10-CM

## 2018-12-17 NOTE — PROGRESS NOTES
[address]    Subjective:   Orlin Higgins is a 60 y.o. female with history of HTN, Knee OA, sarcoidosis of skin in remission  CC: F/u on HTN  History provided by patient     HPI:    Patient presents to clinic for follow-up on BP. She is currently on Prinzide 10-12.5mg daily in addition to Norvasc 5mg daily, which was restarted a couple of weeks ago. Patient reports compliance to medications and denies any side effects. She doesn't check BP at home. She currently denies H/A, dizziness, chest pain, palpitations, N/V, abdominal pain, diarrhea or dysuria. However, she endorses chronic knee pain. No other complaint at this visit. Current Outpatient Medications on File Prior to Visit   Medication Sig Dispense Refill    amLODIPine (NORVASC) 5 mg tablet Take 1 Tab by mouth daily. 30 Tab 3    lisinopril-hydroCHLOROthiazide (PRINZIDE, ZESTORETIC) 20-12.5 mg per tablet TAKE ONE TABLET BY MOUTH EVERY DAY FOR HYPERTENSION 30 Tab 5    diclofenac (VOLTAREN) 1 % gel Apply 2 g to affected area four (4) times daily. 1 Each 3    meloxicam (MOBIC) 15 mg tablet Take 1 Tab by mouth daily. 30 Tab 3    augmented betamethasone dipropionate (DIPROLENE-AF) 0.05 % ointment       ketoconazole (NIZORAL) 2 % shampoo Apply to wet scalp, lather, leave on 3 to 5 minutes, and rinse. Indications: DANDRUFF 1 Bottle 2     No current facility-administered medications on file prior to visit.         Patient Active Problem List   Diagnosis Code    S/P laparoscopic cholecystectomy Z90.49    Essential hypertension I10    Sarcoidosis of skin D86.3    Morbid (severe) obesity due to excess calories ([facility] [address] E66.01       Social History     Socioeconomic History    Marital status: UNKNOWN     Spouse name: Not on file    Number of children: Not on file    Years of education: Not on file    Highest education level: Not on file   Social Needs    Financial resource strain: Not on file    Food insecurity - worry: Not on file   [facility] Food insecurity - inability: Not on file    Transportation needs - medical: Not on file   [employer] needs - non-medical: Not on file   Occupational History    Not on file   Tobacco Use    Smoking status: Former Smoker     Packs/day: 1.00     Years: 10.00     Pack years: 10.00     Last attempt to quit: 2014     Years since quittin.2    Smokeless tobacco: Never Used    Tobacco comment: one cigarette per day when pt smokes   Substance and Sexual Activity    Alcohol use: No     Alcohol/week: 0.0 oz    Drug use: No    Sexual activity: Not on file   Other Topics Concern    Not on file   Social History Narrative    Not on file       Review of Systems   Constitutional: Negative for chills and fever. HENT: Negative for congestion. Respiratory: Negative for cough. Cardiovascular: Negative for chest pain and palpitations. Gastrointestinal: Negative for abdominal pain, diarrhea and vomiting. Genitourinary: Negative for dysuria. Musculoskeletal: Positive for joint pain (b/l knee pain). Neurological: Negative for dizziness and headaches. Psychiatric/Behavioral: Negative for depression. Objective:     Visit Vitals  /80 (BP 1 Location: Right arm, BP Patient Position: Sitting)   Pulse 85   Temp 98.7 °F (37.1 °C) (Oral)   Resp 20   Ht 5' 6\" (1.676 m)   Wt 240 lb (108.9 kg)   SpO2 98%   BMI 38.74 kg/m²        Physical Exam   Constitutional: She is oriented to person, place, and time. Obese, pleasant, in NAD   Cardiovascular: Normal rate, regular rhythm and normal heart sounds. No murmur heard. Pulmonary/Chest: Effort normal and breath sounds normal. No respiratory distress. Abdominal: Soft. Bowel sounds are normal. She exhibits no distension. Musculoskeletal: Normal range of motion. She exhibits no edema. Neurological: She is alert and oriented to person, place, and time. Pertinent Labs/Studies: N/A      Assessment and orders:       ICD-10-CM ICD-9-CM    1. Essential hypertension I10 401.9    2. Primary osteoarthritis of both knees M17.0 715.16    3. Morbid (severe) obesity due to excess calories ([facility] [address] E66.01 278.01      Diagnoses and all orders for this visit:    1. Essential hypertension  BP stable at this visit. Continue home Prinzide and Norvasc at current dose. 2. Primary osteoarthritis of both knees  Patient reports making appointment with pain specialist in March, but will try to get sooner date at different office. Counseled on using Voltaren gel prn, as well as Mobic daily for now. She expressed understanding. 3. Morbid (severe) obesity due to excess calories [facility]  Patient is currently working on weight loss with decreasing carbohydrate and fat intake. She has lost 10lbs this past month per chart review. Will continue to monitor at next visit. Follow-up Disposition:  Return in about 3 months (around 3/17/2019) for follow-up, BP check. I have reviewed patient medical and social history and medications. I have reviewed pertinent labs results and other data. I have discussed the diagnosis with the patient and the intended plan as seen in the above orders. The patient has received an after-visit summary and questions were answered concerning future plans. I have discussed medication side effects and warnings with the patient as well.     Stanton Mulligan MD  Resident [facility]  12/20/18

## 2018-12-19 VITALS
DIASTOLIC BLOOD PRESSURE: 80 MMHG | RESPIRATION RATE: 20 BRPM | TEMPERATURE: 98.7 F | HEART RATE: 85 BPM | HEIGHT: 66 IN | SYSTOLIC BLOOD PRESSURE: 125 MMHG | WEIGHT: 240 LBS | BODY MASS INDEX: 38.57 KG/M2 | OXYGEN SATURATION: 98 %

## 2019-02-17 ENCOUNTER — HOSPITAL ENCOUNTER (EMERGENCY)
Age: 59
Discharge: HOME | End: 2019-02-17
Attending: EMERGENCY MEDICINE
Payer: MEDICARE

## 2019-02-17 VITALS
HEART RATE: 105 BPM | RESPIRATION RATE: 18 BRPM | OXYGEN SATURATION: 98 % | DIASTOLIC BLOOD PRESSURE: 73 MMHG | TEMPERATURE: 99.8 F | SYSTOLIC BLOOD PRESSURE: 134 MMHG

## 2019-02-17 DIAGNOSIS — J11.1 INFLUENZA-LIKE ILLNESS: Primary | ICD-10-CM

## 2019-02-17 PROCEDURE — 99282 EMERGENCY DEPT VISIT SF MDM: CPT

## 2019-02-17 RX ORDER — ALBUTEROL SULFATE 90 UG/1
2 AEROSOL, METERED RESPIRATORY (INHALATION)
Qty: 1 INHALER | Refills: 0 | Status: SHIPPED | OUTPATIENT
Start: 2019-02-17 | End: 2021-08-16 | Stop reason: SDUPTHER

## 2019-02-17 RX ORDER — PREDNISONE 20 MG/1
40 TABLET ORAL DAILY
Qty: 10 TAB | Refills: 0 | Status: SHIPPED | OUTPATIENT
Start: 2019-02-17 | End: 2019-02-22

## 2019-02-17 RX ORDER — BENZONATATE 100 MG/1
100 CAPSULE ORAL
Qty: 30 CAP | Refills: 0 | Status: SHIPPED | OUTPATIENT
Start: 2019-02-17 | End: 2019-02-24

## 2019-02-17 RX ORDER — OSELTAMIVIR PHOSPHATE 75 MG/1
75 CAPSULE ORAL 2 TIMES DAILY
Qty: 10 CAP | Refills: 0 | Status: SHIPPED | OUTPATIENT
Start: 2019-02-17 | End: 2019-02-22

## 2019-02-17 NOTE — ED TRIAGE NOTES
\"i've been exposed to the flu and bronchitis. \" pt started with body aches and chills last night. Productive cough with white sputum. Pt had flu shot

## 2019-02-17 NOTE — ED NOTES
Patient given discharge instructions and prescriptions. No questions regarding discharge. Apple assisted to ED lobby for discharge by daughter.
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
## 2019-03-15 ENCOUNTER — HOSPITAL ENCOUNTER (OUTPATIENT)
Dept: LAB | Age: 59
Discharge: HOME | End: 2019-03-15
Payer: MEDICARE

## 2019-03-15 ENCOUNTER — OFFICE VISIT (OUTPATIENT)
Dept: FAMILY MEDICINE CLINIC | Age: 59
End: 2019-03-15

## 2019-03-15 VITALS
WEIGHT: 212 LBS | OXYGEN SATURATION: 94 % | BODY MASS INDEX: 34.07 KG/M2 | HEIGHT: 66 IN | RESPIRATION RATE: 18 BRPM | SYSTOLIC BLOOD PRESSURE: 123 MMHG | TEMPERATURE: 98.8 F | HEART RATE: 74 BPM | DIASTOLIC BLOOD PRESSURE: 87 MMHG

## 2019-03-15 DIAGNOSIS — F51.01 PRIMARY INSOMNIA: ICD-10-CM

## 2019-03-15 DIAGNOSIS — F41.0 PANIC ATTACK: ICD-10-CM

## 2019-03-15 DIAGNOSIS — F41.9 ANXIETY: ICD-10-CM

## 2019-03-15 DIAGNOSIS — M17.0 PRIMARY OSTEOARTHRITIS OF BOTH KNEES: ICD-10-CM

## 2019-03-15 DIAGNOSIS — I10 ESSENTIAL HYPERTENSION: Primary | ICD-10-CM

## 2019-03-15 PROCEDURE — 84443 ASSAY THYROID STIM HORMONE: CPT

## 2019-03-15 PROCEDURE — 36415 COLL VENOUS BLD VENIPUNCTURE: CPT

## 2019-03-15 RX ORDER — HYDROXYZINE PAMOATE 25 MG/1
25 CAPSULE ORAL
Qty: 30 CAP | Refills: 1 | Status: SHIPPED | OUTPATIENT
Start: 2019-03-15 | End: 2019-04-05 | Stop reason: SDUPTHER

## 2019-03-15 RX ORDER — LISINOPRIL AND HYDROCHLOROTHIAZIDE 12.5; 2 MG/1; MG/1
TABLET ORAL
Qty: 90 TAB | Refills: 3 | Status: SHIPPED | OUTPATIENT
Start: 2019-03-15

## 2019-03-15 RX ORDER — KETOCONAZOLE 20 MG/ML
SHAMPOO TOPICAL
Qty: 1 BOTTLE | Refills: 2 | Status: SHIPPED | OUTPATIENT
Start: 2019-03-15

## 2019-03-15 RX ORDER — DICLOFENAC SODIUM 10 MG/G
2 GEL TOPICAL 4 TIMES DAILY
Qty: 1 EACH | Refills: 3 | Status: SHIPPED | OUTPATIENT
Start: 2019-03-15

## 2019-03-15 RX ORDER — AMLODIPINE BESYLATE 5 MG/1
5 TABLET ORAL DAILY
Qty: 90 TAB | Refills: 3 | Status: SHIPPED | OUTPATIENT
Start: 2019-03-15

## 2019-03-15 RX ORDER — FLUOXETINE HYDROCHLORIDE 20 MG/1
20 CAPSULE ORAL DAILY
Qty: 30 CAP | Refills: 0 | Status: SHIPPED | OUTPATIENT
Start: 2019-03-15 | End: 2019-04-05 | Stop reason: SDUPTHER

## 2019-03-15 NOTE — PROGRESS NOTES
HPI     CC: medication refill, anxiety    Raven Aguilar is a 60 y.o. female with HTN and asthma who presents for medication refill. Osteoarthritis of bilateral knees  - requesting refill of Declofenac gel, which helps symptoms. Also taking Mobic PRN   - working on diet and exercise with her daughter. Has lost 38lbs since 11/2018. HTN   - stable, well controlled at home. Denies headache, blurry vision, chest pain, SOB, nausea, vomiting  - requesting refill of Norvasc 5 mg and Prinzide 20-12.5 mg daily, which she takes regularly. - working on diet and exercise    Anxiety, Panic attacks  - has had long hx of anxiety and occasional panic attacks, but has never been on medication for this. Symptoms worsened over the past year after her father passed away   - GAD7 of 25   - PHQ9 of 12 without SI or HI.   - has strong family support. PMHx - Reviewed  Past Medical History:   Diagnosis Date    Arthritis     shoulders    Asthma     Hypertension     Other ill-defined conditions(559.89)     gallbladder       Meds - Reviewed  Current Outpatient Medications   Medication Sig Dispense Refill    albuterol (PROVENTIL HFA, VENTOLIN HFA, PROAIR HFA) 90 mcg/actuation inhaler Take 2 Puffs by inhalation every four (4) hours as needed for Wheezing. 1 Inhaler 0    amLODIPine (NORVASC) 5 mg tablet Take 1 Tab by mouth daily. 30 Tab 3    lisinopril-hydroCHLOROthiazide (PRINZIDE, ZESTORETIC) 20-12.5 mg per tablet TAKE ONE TABLET BY MOUTH EVERY DAY FOR HYPERTENSION 30 Tab 5    diclofenac (VOLTAREN) 1 % gel Apply 2 g to affected area four (4) times daily. 1 Each 3    ketoconazole (NIZORAL) 2 % shampoo Apply to wet scalp, lather, leave on 3 to 5 minutes, and rinse. Indications: DANDRUFF 1 Bottle 2    meloxicam (MOBIC) 15 mg tablet Take 1 Tab by mouth daily.  30 Tab 3       Allergies - Reviewed  No Known Allergies    Smoker - Reviewed  Social History     Tobacco Use   Smoking Status Former Smoker    Packs/day: 1.00    Years: 10.00    Pack years: 10.00    Last attempt to quit: 2014    Years since quittin.4   Smokeless Tobacco Never Used   Tobacco Comment    one cigarette per day when pt smokes       ETOH - Reviewed  Social History     Substance and Sexual Activity   Alcohol Use No    Alcohol/week: 0.0 oz       FH - Reviewed  Family History   Problem Relation Age of Onset    Hypertension Mother     Colon Cancer Father     Colon Cancer Paternal Uncle     Stroke Maternal Grandmother     Colon Cancer Paternal Grandfather     Stroke Other        ROS:  Review of Systems   Constitutional: Negative. Negative for activity change, appetite change, diaphoresis, fatigue and unexpected weight change. Eyes: Negative for visual disturbance. Respiratory: Negative. Negative for chest tightness and shortness of breath. Cardiovascular: Negative. Negative for chest pain and palpitations. Endocrine: Negative for cold intolerance and heat intolerance. Musculoskeletal: Positive for arthralgias. Negative for back pain, gait problem and myalgias. Neurological: Negative. Negative for dizziness, light-headedness and headaches. Psychiatric/Behavioral: Positive for dysphoric mood and sleep disturbance. Negative for agitation, behavioral problems, confusion, decreased concentration, hallucinations, self-injury and suicidal ideas. The patient is nervous/anxious. The patient is not hyperactive.         Physical Exam:  Visit Vitals  /87 (BP 1 Location: Right arm, BP Patient Position: Sitting)   Pulse 74   Temp 98.8 °F (37.1 °C) (Oral)   Resp 18   Ht 5' 6\" (1.676 m)   Wt 212 lb (96.2 kg)   SpO2 94%   BMI 34.22 kg/m²       Wt Readings from Last 3 Encounters:   03/15/19 212 lb (96.2 kg)   18 240 lb (108.9 kg)   18 250 lb (113.4 kg)     BP Readings from Last 3 Encounters:   03/15/19 123/87   19 134/73   18 125/80        Physical Exam   Constitutional: She is oriented to person, place, and time. She appears well-developed and well-nourished. No distress. HENT:   Head: Normocephalic and atraumatic. Mouth/Throat: Oropharynx is clear and moist.   Eyes: Conjunctivae are normal. No scleral icterus. Neck: Normal range of motion. Neck supple. Cardiovascular: Normal rate and regular rhythm. Pulmonary/Chest: Effort normal and breath sounds normal. No respiratory distress. She has no wheezes. Musculoskeletal:   Bilateral knee medial joint line tenderness. + crepitus   Neurological: She is alert and oriented to person, place, and time. She exhibits normal muscle tone. Coordination normal.   Skin: Skin is warm and dry. Capillary refill takes less than 2 seconds. She is not diaphoretic. Psychiatric: She has a normal mood and affect. Her behavior is normal. Judgment and thought content normal.   Nursing note and vitals reviewed. Assessment     60 y.o. female with HTN and obesity presents for HTN, OA, and anxiety:  Patient Active Problem List   Diagnosis Code    S/P laparoscopic cholecystectomy Z90.49    Essential hypertension I10    Sarcoidosis of skin D86.3    BMI 34.0-34.9,adult Z68.34    Anxiety F41.9    Panic attack F41.0    Primary osteoarthritis of both knees M17.0       Today's diagnoses are:    ICD-10-CM ICD-9-CM    1. Essential hypertension I10 401.9 amLODIPine (NORVASC) 5 mg tablet      lisinopril-hydroCHLOROthiazide (PRINZIDE, ZESTORETIC) 20-12.5 mg per tablet   2. Primary osteoarthritis of both knees M17.0 715.16 diclofenac (VOLTAREN) 1 % gel   3. Primary insomnia F51.01 307.42    4. Anxiety F41.9 300.00 FLUoxetine (PROZAC) 20 mg capsule      hydrOXYzine pamoate (VISTARIL) 25 mg capsule      TSH RFX ON ABNORMAL TO FREE T4      BEHAV ASSMT W/SCORE & DOCD/STAND INSTRUMENT   5. BMI 34.0-34.9,adult Z68.34 V85.34    6. Panic attack F41.0 300.01               Plan     1. HTN - 123/87, at goal  - refill Norvasc and Prinzide   - continue efforts with diet and exercise     2.  OA - stable  - refill Voltaren gel   - continue Mobic PRN  - continue efforts with diet and exercise    3. Body mass index is 34.22 kg/m². - has lost 38lbs since 11/2018 by diet and exercise  - continue efforts with diet and exercise     4. Anxiety, Panic attacks  - GAD7 of 18. PHQ9 of 12, without SI/ HI  - Trial of Prozac 20 mg daily  - Trial of Atarax PRN   - check TSH    Follow up in 2-3 weeks. Scheduled 4/5 with me. Prior labs and imaging were reviewed. I have discussed the diagnosis with the patient and the intended plan as seen in the above orders. The patient has received an after-visit summary and questions were answered concerning future plans. I have discussed medication side effects and warnings with the patient as well. Patient discussed with Dr. Jameel Hilton, Attending Physician.     Alex Page MD, PGY3  Family Medicine Resident

## 2019-03-15 NOTE — PROGRESS NOTES
1. Have you been to the ER, urgent care clinic since your last visit? Hospitalized since your last visit? No    2. Have you seen or consulted any other health care providers outside of the [address] [place] [address] since your last visit? Include any pap smears or colon screening. No    Chief Complaint   Patient presents with    Medication Refill     Blood pressure/voltaren gel/nizoral shampoo    Anxiety     Blood pressure 123/87, pulse 74, temperature 98.8 °F (37.1 °C), temperature source Oral, resp. rate 18, height 5' 6\" (1.676 m), weight 212 lb (96.2 kg), SpO2 94 %.

## 2019-03-16 LAB — TSH SERPL DL<=0.005 MIU/L-ACNC: 2 UIU/ML (ref 0.45–4.5)

## 2019-03-17 PROBLEM — F41.0 PANIC ATTACK: Status: ACTIVE | Noted: 2019-03-17

## 2019-03-17 PROBLEM — F41.9 ANXIETY: Status: ACTIVE | Noted: 2019-03-17

## 2019-03-17 PROBLEM — M17.0 PRIMARY OSTEOARTHRITIS OF BOTH KNEES: Status: ACTIVE | Noted: 2019-03-17

## 2019-03-18 ENCOUNTER — TELEPHONE (OUTPATIENT)
Dept: FAMILY MEDICINE CLINIC | Age: 59
End: 2019-03-18

## 2019-03-18 NOTE — TELEPHONE ENCOUNTER
Attempted to call patient regarding labs. TSH wnl.  No answer,  left to call back     Teagan Palma MD

## 2019-04-05 ENCOUNTER — OFFICE VISIT (OUTPATIENT)
Dept: FAMILY MEDICINE CLINIC | Age: 59
End: 2019-04-05

## 2019-04-05 VITALS
HEIGHT: 66 IN | BODY MASS INDEX: 35.36 KG/M2 | OXYGEN SATURATION: 96 % | HEART RATE: 82 BPM | RESPIRATION RATE: 18 BRPM | WEIGHT: 220 LBS | DIASTOLIC BLOOD PRESSURE: 86 MMHG | SYSTOLIC BLOOD PRESSURE: 116 MMHG | TEMPERATURE: 98.4 F

## 2019-04-05 DIAGNOSIS — F41.9 ANXIETY: ICD-10-CM

## 2019-04-05 DIAGNOSIS — F51.01 PRIMARY INSOMNIA: Primary | ICD-10-CM

## 2019-04-05 RX ORDER — HYDROXYZINE PAMOATE 25 MG/1
25 CAPSULE ORAL
Qty: 30 CAP | Refills: 1 | Status: SHIPPED | OUTPATIENT
Start: 2019-04-05

## 2019-04-05 RX ORDER — FLUOXETINE HYDROCHLORIDE 20 MG/1
20 CAPSULE ORAL DAILY
Qty: 90 CAP | Refills: 3 | Status: SHIPPED | OUTPATIENT
Start: 2019-04-05

## 2019-04-05 NOTE — PROGRESS NOTES
1. Have you been to the ER, urgent care clinic since your last visit? Hospitalized since your last visit? No    2. Have you seen or consulted any other health care providers outside of the [address] [place] [address] since your last visit? Include any pap smears or colon screening. No    Chief Complaint   Patient presents with    Medication Evaluation     follow up     Blood pressure 161/82, pulse 84, temperature 98.4 °F (36.9 °C), temperature source Oral, resp. rate 18, height 5' 6\" (1.676 m), weight 220 lb (99.8 kg), SpO2 96 %.

## 2019-04-05 NOTE — PROGRESS NOTES
HPI     CC: med check, anxiety     José Luis Coyle is a 60 y.o. female who presents for follow up of anxiety and panic attacks. At last appointment, was started on Prozac and Atarax PRN for anxiety with panic attacks. Pt states she has noticed marked improvement in symptoms of anxiety and has not had panic attacks. States she is much calmer and happy with her treatment. She is sleeping better and eating normally. PMHx - Reviewed  Past Medical History:   Diagnosis Date    Arthritis     shoulders    Asthma     Hypertension     Other ill-defined conditions(969.40)     gallbladder    Primary osteoarthritis of both knees 3/17/2019       Meds - Reviewed  Current Outpatient Medications   Medication Sig Dispense Refill    FLUoxetine (PROZAC) 20 mg capsule Take 1 Cap by mouth daily. 90 Cap 3    hydrOXYzine pamoate (VISTARIL) 25 mg capsule Take 1 Cap by mouth three (3) times daily as needed for Anxiety. 30 Cap 1    diclofenac (VOLTAREN) 1 % gel Apply 2 g to affected area four (4) times daily. 1 Each 3    amLODIPine (NORVASC) 5 mg tablet Take 1 Tab by mouth daily. 90 Tab 3    lisinopril-hydroCHLOROthiazide (PRINZIDE, ZESTORETIC) 20-12.5 mg per tablet TAKE ONE TABLET BY MOUTH EVERY DAY FOR HYPERTENSION 90 Tab 3    ketoconazole (NIZORAL) 2 % shampoo Apply to wet scalp, lather, leave on 3 to 5 minutes, and rinse. 1 Bottle 2    albuterol (PROVENTIL HFA, VENTOLIN HFA, PROAIR HFA) 90 mcg/actuation inhaler Take 2 Puffs by inhalation every four (4) hours as needed for Wheezing. 1 Inhaler 0    meloxicam (MOBIC) 15 mg tablet Take 1 Tab by mouth daily.  30 Tab 3       Allergies - Reviewed  No Known Allergies    Smoker - Reviewed  Social History     Tobacco Use   Smoking Status Former Smoker    Packs/day: 1.00    Years: 10.00    Pack years: 10.00    Last attempt to quit: 2014    Years since quittin.5   Smokeless Tobacco Never Used   Tobacco Comment    one cigarette per day when pt smokes       ETOH - Reviewed  Social History     Substance and Sexual Activity   Alcohol Use No    Alcohol/week: 0.0 oz       FH - Reviewed  Family History   Problem Relation Age of Onset    Hypertension Mother     Colon Cancer Father     Colon Cancer Paternal Uncle     Stroke Maternal Grandmother     Colon Cancer Paternal Grandfather     Stroke Other        ROS:  Review of Systems   Constitutional: Negative. Respiratory: Negative for chest tightness and shortness of breath. Cardiovascular: Negative for chest pain, palpitations and leg swelling. Gastrointestinal: Negative for diarrhea, nausea and vomiting. Neurological: Negative. Negative for dizziness, light-headedness and headaches. Psychiatric/Behavioral: Negative. Negative for behavioral problems, decreased concentration, sleep disturbance and suicidal ideas. The patient is not nervous/anxious. Physical Exam:  Visit Vitals  /86 (BP 1 Location: Right arm, BP Patient Position: Sitting)   Pulse 82   Temp 98.4 °F (36.9 °C) (Oral)   Resp 18   Ht 5' 6\" (1.676 m)   Wt 220 lb (99.8 kg)   SpO2 96%   BMI 35.51 kg/m²       Wt Readings from Last 3 Encounters:   04/05/19 220 lb (99.8 kg)   03/15/19 212 lb (96.2 kg)   12/17/18 240 lb (108.9 kg)     BP Readings from Last 3 Encounters:   04/05/19 116/86   03/15/19 123/87   02/17/19 134/73        Physical Exam         Assessment     60 y.o. female presents with:  Patient Active Problem List   Diagnosis Code    S/P laparoscopic cholecystectomy Z90.49    Essential hypertension I10    Sarcoidosis of skin D86.3    BMI 34.0-34.9,adult Z68.34    Anxiety F41.9    Panic attack F41.0    Primary osteoarthritis of both knees M17.0       Today's diagnoses are:    ICD-10-CM ICD-9-CM    1. Primary insomnia F51.01 307.42    2. Anxiety F41.9 300.00 FLUoxetine (PROZAC) 20 mg capsule      hydrOXYzine pamoate (VISTARIL) 25 mg capsule              Plan     1.  Anxiety, Panic attacks, insomnia - improved  - refill Prozac and Atarax  - follow up as needed       Prior labs and imaging were reviewed. I have discussed the diagnosis with the patient and the intended plan as seen in the above orders. The patient has received an after-visit summary and questions were answered concerning future plans. I have discussed medication side effects and warnings with the patient as well. Patient discussed with Dr. Pamela Layton, Attending Physician.     Greg Howard MD, PGY3  Family Medicine Resident

## 2021-08-16 ENCOUNTER — APPOINTMENT (OUTPATIENT)
Dept: GENERAL RADIOLOGY | Age: 61
End: 2021-08-16
Attending: STUDENT IN AN ORGANIZED HEALTH CARE EDUCATION/TRAINING PROGRAM
Payer: MEDICARE

## 2021-08-16 ENCOUNTER — HOSPITAL ENCOUNTER (EMERGENCY)
Age: 61
Discharge: HOME | End: 2021-08-16
Attending: EMERGENCY MEDICINE
Payer: MEDICARE

## 2021-08-16 ENCOUNTER — APPOINTMENT (OUTPATIENT)
Dept: CT IMAGING | Age: 61
End: 2021-08-16
Attending: EMERGENCY MEDICINE
Payer: MEDICARE

## 2021-08-16 VITALS
HEART RATE: 84 BPM | TEMPERATURE: 99.9 F | OXYGEN SATURATION: 92 % | RESPIRATION RATE: 25 BRPM | SYSTOLIC BLOOD PRESSURE: 104 MMHG | DIASTOLIC BLOOD PRESSURE: 70 MMHG

## 2021-08-16 DIAGNOSIS — J20.9 ACUTE BRONCHITIS, UNSPECIFIED ORGANISM: Primary | ICD-10-CM

## 2021-08-16 LAB
ALBUMIN SERPL-MCNC: 3.8 G/DL (ref 3.5–5)
ALBUMIN/GLOB SERPL: 1 {RATIO} (ref 1.1–2.2)
ALP SERPL-CCNC: 60 U/L (ref 45–117)
ALT SERPL-CCNC: 31 U/L (ref 12–78)
ANION GAP SERPL CALC-SCNC: 9 MMOL/L (ref 5–15)
AST SERPL-CCNC: 25 U/L (ref 15–37)
BASOPHILS # BLD: 0 K/UL (ref 0–0.1)
BASOPHILS NFR BLD: 1 % (ref 0–1)
BILIRUB SERPL-MCNC: 0.4 MG/DL (ref 0.2–1)
BUN SERPL-MCNC: 11 MG/DL (ref 6–20)
BUN/CREAT SERPL: 13 (ref 12–20)
CALCIUM SERPL-MCNC: 9 MG/DL (ref 8.5–10.1)
CHLORIDE SERPL-SCNC: 98 MMOL/L (ref 97–108)
CO2 SERPL-SCNC: 29 MMOL/L (ref 21–32)
COMMENT, HOLDF: NORMAL
COVID-19 RAPID TEST, COVR: NOT DETECTED
CREAT SERPL-MCNC: 0.87 MG/DL (ref 0.55–1.02)
D DIMER PPP FEU-MCNC: 2.15 MG/L FEU (ref 0–0.65)
DIFFERENTIAL METHOD BLD: ABNORMAL
EOSINOPHIL # BLD: 0 K/UL (ref 0–0.4)
EOSINOPHIL NFR BLD: 0 % (ref 0–7)
ERYTHROCYTE [DISTWIDTH] IN BLOOD BY AUTOMATED COUNT: 18.6 % (ref 11.5–14.5)
FLUAV AG NPH QL IA: NEGATIVE
FLUBV AG NOSE QL IA: NEGATIVE
GLOBULIN SER CALC-MCNC: 3.9 G/DL (ref 2–4)
GLUCOSE SERPL-MCNC: 145 MG/DL (ref 65–100)
HCT VFR BLD AUTO: 39.9 % (ref 35–47)
HGB BLD-MCNC: 12.3 G/DL (ref 11.5–16)
IMM GRANULOCYTES # BLD AUTO: 0 K/UL (ref 0–0.04)
IMM GRANULOCYTES NFR BLD AUTO: 0 % (ref 0–0.5)
LACTATE BLD-SCNC: 1.24 MMOL/L (ref 0.4–2)
LYMPHOCYTES # BLD: 0.8 K/UL (ref 0.8–3.5)
LYMPHOCYTES NFR BLD: 12 % (ref 12–49)
MCH RBC QN AUTO: 22.4 PG (ref 26–34)
MCHC RBC AUTO-ENTMCNC: 30.8 G/DL (ref 30–36.5)
MCV RBC AUTO: 72.5 FL (ref 80–99)
MONOCYTES # BLD: 0.7 K/UL (ref 0–1)
MONOCYTES NFR BLD: 10 % (ref 5–13)
NEUTS SEG # BLD: 5.4 K/UL (ref 1.8–8)
NEUTS SEG NFR BLD: 77 % (ref 32–75)
NRBC # BLD: 0 K/UL (ref 0–0.01)
NRBC BLD-RTO: 0 PER 100 WBC
PLATELET # BLD AUTO: 223 K/UL (ref 150–400)
PMV BLD AUTO: 9.7 FL (ref 8.9–12.9)
POTASSIUM SERPL-SCNC: 4 MMOL/L (ref 3.5–5.1)
PROT SERPL-MCNC: 7.7 G/DL (ref 6.4–8.2)
RBC # BLD AUTO: 5.5 M/UL (ref 3.8–5.2)
SAMPLES BEING HELD,HOLD: NORMAL
SARS-COV-2, COV2: NORMAL
SODIUM SERPL-SCNC: 136 MMOL/L (ref 136–145)
SOURCE, COVRS: NORMAL
TROPONIN I SERPL-MCNC: <0.05 NG/ML
WBC # BLD AUTO: 7 K/UL (ref 3.6–11)

## 2021-08-16 PROCEDURE — 74011000250 HC RX REV CODE- 250: Performed by: EMERGENCY MEDICINE

## 2021-08-16 PROCEDURE — 71045 X-RAY EXAM CHEST 1 VIEW: CPT

## 2021-08-16 PROCEDURE — 74011250637 HC RX REV CODE- 250/637: Performed by: EMERGENCY MEDICINE

## 2021-08-16 PROCEDURE — U0005 INFEC AGEN DETEC AMPLI PROBE: HCPCS

## 2021-08-16 PROCEDURE — 87804 INFLUENZA ASSAY W/OPTIC: CPT

## 2021-08-16 PROCEDURE — 71275 CT ANGIOGRAPHY CHEST: CPT

## 2021-08-16 PROCEDURE — 87040 BLOOD CULTURE FOR BACTERIA: CPT

## 2021-08-16 PROCEDURE — 83605 ASSAY OF LACTIC ACID: CPT

## 2021-08-16 PROCEDURE — 94640 AIRWAY INHALATION TREATMENT: CPT

## 2021-08-16 PROCEDURE — 87635 SARS-COV-2 COVID-19 AMP PRB: CPT

## 2021-08-16 PROCEDURE — 74011250636 HC RX REV CODE- 250/636: Performed by: EMERGENCY MEDICINE

## 2021-08-16 PROCEDURE — 36415 COLL VENOUS BLD VENIPUNCTURE: CPT

## 2021-08-16 PROCEDURE — 74011250637 HC RX REV CODE- 250/637: Performed by: STUDENT IN AN ORGANIZED HEALTH CARE EDUCATION/TRAINING PROGRAM

## 2021-08-16 PROCEDURE — 74011000636 HC RX REV CODE- 636: Performed by: EMERGENCY MEDICINE

## 2021-08-16 PROCEDURE — 93005 ELECTROCARDIOGRAM TRACING: CPT

## 2021-08-16 PROCEDURE — 96374 THER/PROPH/DIAG INJ IV PUSH: CPT

## 2021-08-16 PROCEDURE — 80053 COMPREHEN METABOLIC PANEL: CPT

## 2021-08-16 PROCEDURE — 85025 COMPLETE CBC W/AUTO DIFF WBC: CPT

## 2021-08-16 PROCEDURE — 84484 ASSAY OF TROPONIN QUANT: CPT

## 2021-08-16 PROCEDURE — 85379 FIBRIN DEGRADATION QUANT: CPT

## 2021-08-16 PROCEDURE — 99285 EMERGENCY DEPT VISIT HI MDM: CPT

## 2021-08-16 RX ORDER — DOXYCYCLINE HYCLATE 100 MG
100 TABLET ORAL
Status: COMPLETED | OUTPATIENT
Start: 2021-08-16 | End: 2021-08-16

## 2021-08-16 RX ORDER — IPRATROPIUM BROMIDE AND ALBUTEROL SULFATE 2.5; .5 MG/3ML; MG/3ML
3 SOLUTION RESPIRATORY (INHALATION)
Status: DISPENSED | OUTPATIENT
Start: 2021-08-16 | End: 2021-08-16

## 2021-08-16 RX ORDER — DEXAMETHASONE 6 MG/1
6 TABLET ORAL DAILY
Qty: 4 TABLET | Refills: 0 | Status: SHIPPED | OUTPATIENT
Start: 2021-08-17 | End: 2021-08-21

## 2021-08-16 RX ORDER — ACETAMINOPHEN 500 MG
1000 TABLET ORAL
Status: COMPLETED | OUTPATIENT
Start: 2021-08-16 | End: 2021-08-16

## 2021-08-16 RX ORDER — DOXYCYCLINE HYCLATE 100 MG
100 TABLET ORAL 2 TIMES DAILY
Qty: 14 TABLET | Refills: 0 | Status: SHIPPED | OUTPATIENT
Start: 2021-08-16 | End: 2021-08-23

## 2021-08-16 RX ORDER — DEXAMETHASONE SODIUM PHOSPHATE 10 MG/ML
10 INJECTION INTRAMUSCULAR; INTRAVENOUS ONCE
Status: COMPLETED | OUTPATIENT
Start: 2021-08-16 | End: 2021-08-16

## 2021-08-16 RX ORDER — ALBUTEROL SULFATE 90 UG/1
2 AEROSOL, METERED RESPIRATORY (INHALATION)
Qty: 1 INHALER | Refills: 0 | Status: SHIPPED | OUTPATIENT
Start: 2021-08-16

## 2021-08-16 RX ADMIN — IPRATROPIUM BROMIDE AND ALBUTEROL SULFATE 3 ML: .5; 3 SOLUTION RESPIRATORY (INHALATION) at 16:39

## 2021-08-16 RX ADMIN — IOPAMIDOL 100 ML: 755 INJECTION, SOLUTION INTRAVENOUS at 17:14

## 2021-08-16 RX ADMIN — ACETAMINOPHEN 1000 MG: 500 TABLET ORAL at 16:19

## 2021-08-16 RX ADMIN — DEXAMETHASONE SODIUM PHOSPHATE 10 MG: 10 INJECTION, SOLUTION INTRAMUSCULAR; INTRAVENOUS at 16:20

## 2021-08-16 RX ADMIN — DOXYCYCLINE HYCLATE 100 MG: 100 TABLET, COATED ORAL at 18:44

## 2021-08-16 NOTE — ED TRIAGE NOTES
Pt. Exposed to covid 2 weeks ago. Is now feeling short of breath, coughing with no production. Has had both shots.

## 2021-08-16 NOTE — ED NOTES
The patient was discharged home by provider in stable condition. The patient is alert and oriented, in no respiratory distress and discharge vital signs obtained. The patient's diagnosis, condition and treatment were explained. The patient expressed understanding. 2 prescriptions given. No work/school note given. A discharge plan has been developed. A  was not involved in the process. Aftercare instructions were given. Pt ambulatory out of the ED.

## 2021-08-16 NOTE — ED PROVIDER NOTES
Niko Arana is a 56 yo F with cough for the past 2 weeks who has developed worsened over the past day. She states she has become winded after little exertion. She had called and spoke with her PCP last week who had called in a cough syrup prescription but she states that she it made her heart race so she stopped taking it. She had 2 dose of Moderna COVID vaccine, completed in late May. She suspects that she got sick after taking care of an infant who had been sick 2 weeks ago.              Past Medical History:   Diagnosis Date    Arthritis     shoulders    Asthma     Hypertension     Other ill-defined conditions(799.89)     gallbladder    Primary osteoarthritis of both knees 3/17/2019       Past Surgical History:   Procedure Laterality Date    HX  SECTION      x 1    HX CHOLECYSTECTOMY      HX GASTRIC BYPASS      MCV     HX HEENT      throat bx - benign    HX OTHER SURGICAL      2008GBP,  Neuro Scarcadosis    HX OTHER SURGICAL      scarcadosis removed from throat    HX TONSILLECTOMY      [place] LAP,CHOLECYSTECTOMY  11         Family History:   Problem Relation Age of Onset    Hypertension Mother     Colon Cancer Father     Colon Cancer Paternal Uncle     Stroke Maternal Grandmother     Colon Cancer Paternal Grandfather     Stroke Other        Social History     Socioeconomic History    Marital status: UNKNOWN     Spouse name: Not on file    Number of children: Not on file    Years of education: Not on file    Highest education level: Not on file   Occupational History    Not on file   Tobacco Use    Smoking status: Former Smoker     Packs/day: 1.00     Years: 10.00     Pack years: 10.00     Quit date: 2014     Years since quittin.8    Smokeless tobacco: Never Used    Tobacco comment: one cigarette per day when pt smokes   Substance and Sexual Activity    Alcohol use: No     Alcohol/week: 0.0 standard drinks    Drug use: No    Sexual activity: Not on file Other Topics Concern    Not on file   Social History Narrative    Not on file     Social Determinants of Health     Financial Resource Strain:     Difficulty of Paying Living Expenses:    Food Insecurity:     Worried About Running Out of Food in the Last Year:     [address] in the Last Year:    Transportation Needs:     Lack of Transportation (Medical):  Lack of Transportation (Non-Medical):    Physical Activity:     Days of Exercise per Week:     Minutes of Exercise per Session:    Stress:     Feeling of Stress :    Social Connections:     Frequency of Communication with Friends and Family:     Frequency of Social Gatherings with Friends and Family:     Attends Gnosticism Services:     Active Member of Clubs or Organizations:     Attends Club or Organization Meetings:     Marital Status:    Intimate Partner Violence:     Fear of Current or Ex-Partner:     Emotionally Abused:     Physically Abused:     Sexually Abused: ALLERGIES: Patient has no known allergies. Review of Systems   Constitutional: Positive for fever. Negative for fatigue. HENT: Negative for sore throat. Eyes: Negative for visual disturbance. Respiratory: Positive for cough and shortness of breath. Cardiovascular: Negative for chest pain. Gastrointestinal: Negative for abdominal pain. Genitourinary: Negative for dysuria. Musculoskeletal: Negative for back pain. Skin: Negative for rash. Neurological: Negative for headaches. Vitals:    08/16/21 1527 08/16/21 1528 08/16/21 1530   BP: (!) 81/68  (!) 87/34   Pulse:   (!) 108   Resp:   22   Temp:   (!) 102.6 °F (39.2 °C)   SpO2: 94% (!) 89% 94%            Physical Exam  Vitals and nursing note reviewed. Constitutional:       General: She is not in acute distress. Appearance: She is well-developed. HENT:      Head: Normocephalic and atraumatic.    Eyes:      Conjunctiva/sclera: Conjunctivae normal.   Neck:      Trachea: Phonation normal.   Cardiovascular:      Rate and Rhythm: Tachycardia present. Pulmonary:      Effort: Pulmonary effort is normal. No respiratory distress. Breath sounds: Wheezing present. Abdominal:      General: There is no distension. Musculoskeletal:         General: No tenderness. Normal range of motion. Cervical back: Normal range of motion. Skin:     General: Skin is warm and dry. Neurological:      Mental Status: She is alert. She is not disoriented. Motor: No abnormal muscle tone. MDM     5:07 PM  Patient with increased HR to 150's after dueneb. Patient reassessed and states that she is actually feeling much better after neb and dexamethasone. Shortness of breath is resolved and denies chest pain. Patient states that her HR increases whenever she received albuterol. /72, Lungs clear without wheezing. Will not repeat additional duoneb doses. RN updated. Labs reviewed and ddimer elevated, CTA ordered to r/o PE. Lactic acid, flu and rapid COVID negative. PCR sent. 6:41 PM  Patient remains in no distress. Will discharge home with doxycycline, dexamethasone and albuterol MDI.      Procedures

## 2021-08-17 ENCOUNTER — PATIENT OUTREACH (OUTPATIENT)
Dept: CASE MANAGEMENT | Age: 61
End: 2021-08-17

## 2021-08-17 LAB
ATRIAL RATE: 112 BPM
ATRIAL RATE: 288 BPM
CALCULATED P AXIS, ECG09: 13 DEGREES
CALCULATED R AXIS, ECG10: -35 DEGREES
CALCULATED R AXIS, ECG10: -38 DEGREES
CALCULATED T AXIS, ECG11: 151 DEGREES
CALCULATED T AXIS, ECG11: 91 DEGREES
DIAGNOSIS, 93000: NORMAL
DIAGNOSIS, 93000: NORMAL
P-R INTERVAL, ECG05: 152 MS
Q-T INTERVAL, ECG07: 334 MS
Q-T INTERVAL, ECG07: 346 MS
QRS DURATION, ECG06: 106 MS
QRS DURATION, ECG06: 106 MS
QTC CALCULATION (BEZET), ECG08: 472 MS
QTC CALCULATION (BEZET), ECG08: 517 MS
SARS-COV-2, XPLCVT: NOT DETECTED
SOURCE, COVRS: NORMAL
VENTRICULAR RATE, ECG03: 112 BPM
VENTRICULAR RATE, ECG03: 144 BPM

## 2021-08-18 NOTE — PROGRESS NOTES
08/18/21 Attempted patient outreach for COVID follow up call per protocol. Unable to contact patient, not active on MyChart. Patient is COVID negative. Resolving CELE episode.  APM

## 2021-08-22 LAB
BACTERIA SPEC CULT: NORMAL
SERVICE CMNT-IMP: NORMAL

## 2022-03-19 PROBLEM — M17.0 PRIMARY OSTEOARTHRITIS OF BOTH KNEES: Status: ACTIVE | Noted: 2019-03-17

## 2022-03-19 PROBLEM — F41.0 PANIC ATTACK: Status: ACTIVE | Noted: 2019-03-17

## 2022-03-19 PROBLEM — F41.9 ANXIETY: Status: ACTIVE | Noted: 2019-03-17

## 2022-07-31 ENCOUNTER — APPOINTMENT (OUTPATIENT)
Dept: GENERAL RADIOLOGY | Age: 62
End: 2022-07-31
Attending: STUDENT IN AN ORGANIZED HEALTH CARE EDUCATION/TRAINING PROGRAM
Payer: MEDICARE

## 2022-07-31 ENCOUNTER — HOSPITAL ENCOUNTER (EMERGENCY)
Age: 62
Discharge: HOME | End: 2022-07-31
Attending: STUDENT IN AN ORGANIZED HEALTH CARE EDUCATION/TRAINING PROGRAM
Payer: MEDICARE

## 2022-07-31 VITALS
HEART RATE: 100 BPM | WEIGHT: 278 LBS | BODY MASS INDEX: 44.87 KG/M2 | SYSTOLIC BLOOD PRESSURE: 146 MMHG | DIASTOLIC BLOOD PRESSURE: 83 MMHG | TEMPERATURE: 100.2 F | RESPIRATION RATE: 24 BRPM | OXYGEN SATURATION: 97 %

## 2022-07-31 DIAGNOSIS — U07.1 COVID-19: Primary | ICD-10-CM

## 2022-07-31 LAB
ALBUMIN SERPL-MCNC: 3.7 G/DL (ref 3.5–5)
ALBUMIN/GLOB SERPL: 1.1 {RATIO} (ref 1.1–2.2)
ALP SERPL-CCNC: 58 U/L (ref 45–117)
ALT SERPL-CCNC: 28 U/L (ref 12–78)
ANION GAP SERPL CALC-SCNC: 11 MMOL/L (ref 5–15)
AST SERPL-CCNC: 22 U/L (ref 15–37)
BASOPHILS # BLD: 0 K/UL (ref 0–0.1)
BASOPHILS NFR BLD: 1 % (ref 0–1)
BILIRUB SERPL-MCNC: 0.3 MG/DL (ref 0.2–1)
BUN SERPL-MCNC: 14 MG/DL (ref 6–20)
BUN/CREAT SERPL: 18 (ref 12–20)
CALCIUM SERPL-MCNC: 8.9 MG/DL (ref 8.5–10.1)
CHLORIDE SERPL-SCNC: 98 MMOL/L (ref 97–108)
CO2 SERPL-SCNC: 25 MMOL/L (ref 21–32)
COVID-19 RAPID TEST, COVR: DETECTED
CREAT SERPL-MCNC: 0.79 MG/DL (ref 0.55–1.02)
DIFFERENTIAL METHOD BLD: ABNORMAL
EOSINOPHIL # BLD: 0 K/UL (ref 0–0.4)
EOSINOPHIL NFR BLD: 1 % (ref 0–7)
ERYTHROCYTE [DISTWIDTH] IN BLOOD BY AUTOMATED COUNT: 18 % (ref 11.5–14.5)
GLOBULIN SER CALC-MCNC: 3.4 G/DL (ref 2–4)
GLUCOSE SERPL-MCNC: 123 MG/DL (ref 65–100)
HCT VFR BLD AUTO: 34.7 % (ref 35–47)
HGB BLD-MCNC: 10.5 G/DL (ref 11.5–16)
IMM GRANULOCYTES # BLD AUTO: 0 K/UL (ref 0–0.04)
IMM GRANULOCYTES NFR BLD AUTO: 0 % (ref 0–0.5)
LYMPHOCYTES # BLD: 0.3 K/UL (ref 0.8–3.5)
LYMPHOCYTES NFR BLD: 7 % (ref 12–49)
MAGNESIUM SERPL-MCNC: 1.9 MG/DL (ref 1.6–2.4)
MCH RBC QN AUTO: 21.2 PG (ref 26–34)
MCHC RBC AUTO-ENTMCNC: 30.3 G/DL (ref 30–36.5)
MCV RBC AUTO: 70 FL (ref 80–99)
MONOCYTES # BLD: 0.5 K/UL (ref 0–1)
MONOCYTES NFR BLD: 13 % (ref 5–13)
NEUTS SEG # BLD: 3.3 K/UL (ref 1.8–8)
NEUTS SEG NFR BLD: 78 % (ref 32–75)
NRBC # BLD: 0 K/UL (ref 0–0.01)
NRBC BLD-RTO: 0 PER 100 WBC
PLATELET # BLD AUTO: 185 K/UL (ref 150–400)
PMV BLD AUTO: 10 FL (ref 8.9–12.9)
POTASSIUM SERPL-SCNC: 3.6 MMOL/L (ref 3.5–5.1)
PROT SERPL-MCNC: 7.1 G/DL (ref 6.4–8.2)
RBC # BLD AUTO: 4.96 M/UL (ref 3.8–5.2)
RBC MORPH BLD: ABNORMAL
SODIUM SERPL-SCNC: 134 MMOL/L (ref 136–145)
SOURCE, COVRS: ABNORMAL
TROPONIN-HIGH SENSITIVITY: 11 NG/L (ref 0–51)
WBC # BLD AUTO: 4.1 K/UL (ref 3.6–11)
WBC MORPH BLD: ABNORMAL

## 2022-07-31 PROCEDURE — 74011250636 HC RX REV CODE- 250/636: Performed by: STUDENT IN AN ORGANIZED HEALTH CARE EDUCATION/TRAINING PROGRAM

## 2022-07-31 PROCEDURE — 85025 COMPLETE CBC W/AUTO DIFF WBC: CPT

## 2022-07-31 PROCEDURE — 80053 COMPREHEN METABOLIC PANEL: CPT

## 2022-07-31 PROCEDURE — 87635 SARS-COV-2 COVID-19 AMP PRB: CPT

## 2022-07-31 PROCEDURE — 99285 EMERGENCY DEPT VISIT HI MDM: CPT

## 2022-07-31 PROCEDURE — 93005 ELECTROCARDIOGRAM TRACING: CPT

## 2022-07-31 PROCEDURE — 96374 THER/PROPH/DIAG INJ IV PUSH: CPT

## 2022-07-31 PROCEDURE — 83735 ASSAY OF MAGNESIUM: CPT

## 2022-07-31 PROCEDURE — 71046 X-RAY EXAM CHEST 2 VIEWS: CPT

## 2022-07-31 PROCEDURE — 84484 ASSAY OF TROPONIN QUANT: CPT

## 2022-07-31 PROCEDURE — 36415 COLL VENOUS BLD VENIPUNCTURE: CPT

## 2022-07-31 RX ORDER — ONDANSETRON 2 MG/ML
4 INJECTION INTRAMUSCULAR; INTRAVENOUS
Status: COMPLETED | OUTPATIENT
Start: 2022-07-31 | End: 2022-07-31

## 2022-07-31 RX ORDER — ONDANSETRON 4 MG/1
4 TABLET, ORALLY DISINTEGRATING ORAL
Qty: 20 TABLET | Refills: 0 | Status: SHIPPED | OUTPATIENT
Start: 2022-07-31

## 2022-07-31 RX ADMIN — ONDANSETRON HYDROCHLORIDE 4 MG: 2 SOLUTION INTRAMUSCULAR; INTRAVENOUS at 13:19

## 2022-07-31 NOTE — DISCHARGE INSTRUCTIONS
You presented to the ED with symptoms of signs and symptoms of COVID. Your COVID test was positive. This is likely driving all of your symptoms. Take Tylenol Motrin for fever and discomfort/myalgias. Zofran for nausea.

## 2022-07-31 NOTE — ED PROVIDER NOTES
Patient is a 70-year-old female presented ED with viral symptoms including headache, chills, myalgias, chest pain, palpitations. Patient states that her symptoms feel just like the last time she had COVID. However based on her age cardiac work-up indicated. Chest Pain (Angina)   This is a new problem. The current episode started yesterday. The problem has not changed since onset. The problem occurs constantly. The pain is associated with normal activity. The pain is present in the substernal region. The pain is at a severity of 5/10. The pain is moderate. The quality of the pain is described as sharp. The pain does not radiate. Exacerbated by: Coughing. Associated symptoms include cough, diaphoresis, a fever, malaise/fatigue, palpitations and shortness of breath. She has tried OTC pain medications for the symptoms. The treatment provided mild relief. Risk factors include hypertension. Her past medical history does not include DVT or PE. Pertinent negatives include no cardiac catheterization and no cardiac stents.      Past Medical History:   Diagnosis Date    Arthritis     shoulders    Asthma     Hypertension     Other ill-defined conditions(799.89)     gallbladder    Primary osteoarthritis of both knees 3/17/2019       Past Surgical History:   Procedure Laterality Date    HX  SECTION      x 1    HX CHOLECYSTECTOMY      HX GASTRIC BYPASS      MCV     HX HEENT      throat bx - benign    HX OTHER SURGICAL      2008GBP,  Neuro Scarcadosis    HX OTHER SURGICAL      scarcadosis removed from throat    HX TONSILLECTOMY      [place] LAP,CHOLECYSTECTOMY  11         Family History:   Problem Relation Age of Onset    Hypertension Mother     Colon Cancer Father     Colon Cancer Paternal Uncle     Stroke Maternal Grandmother     Colon Cancer Paternal Grandfather     Stroke Other        Social History     Socioeconomic History    Marital status: SINGLE     Spouse name: Not on file    Number of children: Not on file    Years of education: Not on file    Highest education level: Not on file   Occupational History    Not on file   Tobacco Use    Smoking status: Former     Packs/day: 1.00     Years: 10.00     Pack years: 10.00     Types: Cigarettes     Quit date: 2014     Years since quittin.8    Smokeless tobacco: Never    Tobacco comments:     one cigarette per day when pt smokes   Substance and Sexual Activity    Alcohol use: No     Alcohol/week: 0.0 standard drinks    Drug use: No    Sexual activity: Not on file   Other Topics Concern    Not on file   Social History Narrative    Not on file     Social Determinants of Health     Financial Resource Strain: Not on file   Food Insecurity: Not on file   Transportation Needs: Not on file   Physical Activity: Not on file   Stress: Not on file   Social Connections: Not on file   Intimate Partner Violence: Not on file   Housing Stability: Not on file         ALLERGIES: Patient has no known allergies. Review of Systems   Constitutional:  Positive for activity change, appetite change, chills, diaphoresis, fatigue, fever and malaise/fatigue. HENT: Negative. Eyes: Negative. Respiratory:  Positive for cough and shortness of breath. Cardiovascular:  Positive for chest pain and palpitations. Gastrointestinal: Negative. Endocrine: Negative. Genitourinary: Negative. Musculoskeletal: Negative. Skin: Negative. Allergic/Immunologic: Negative. Neurological: Negative. Hematological: Negative. Psychiatric/Behavioral: Negative. Vitals:    22 1320 22 1415 22 1432 22 1507   BP: (!) 148/90 130/77 138/80 (!) 146/83   Pulse: 97 93 (!) 103 100   Resp: 27 24 27 24   Temp:       SpO2: 97%      Weight:                Physical Exam  Vitals and nursing note reviewed. Constitutional:       General: She is not in acute distress. Appearance: Normal appearance. She is ill-appearing. HENT:      Head: Normocephalic and atraumatic. Right Ear: External ear normal.      Left Ear: External ear normal.      Nose: Nose normal.   Eyes:      Extraocular Movements: Extraocular movements intact. Conjunctiva/sclera: Conjunctivae normal.   Cardiovascular:      Rate and Rhythm: Normal rate. Pulses: Normal pulses. Radial pulses are 2+ on the right side and 2+ on the left side. Heart sounds: Normal heart sounds. Pulmonary:      Effort: Pulmonary effort is normal.      Breath sounds: Normal breath sounds. Chest:      Chest wall: No deformity or tenderness. Abdominal:      General: Abdomen is flat. There is no distension. Tenderness: There is no abdominal tenderness. Musculoskeletal:         General: No deformity or signs of injury. Normal range of motion. Cervical back: Normal range of motion and neck supple. No tenderness. Skin:     General: Skin is warm and dry. Capillary Refill: Capillary refill takes less than 2 seconds. Neurological:      General: No focal deficit present. Mental Status: She is alert and oriented to person, place, and time. Psychiatric:         Attention and Perception: Attention normal.         Mood and Affect: Mood normal.         Behavior: Behavior normal.        MDM     Amount and/or Complexity of Data Reviewed  Decide to obtain previous medical records or to obtain history from someone other than the patient: yes      ED Course as of 08/02/22 1501   Sun Jul 31, 2022   1311 EKG interpretation:   Rhythm: sinus tachycardia; and regular . Rate (approx.): 102; Axis: normal; Intervals: normal ; ST/T wave: normal; EKG documented and interpreted by Keli Vides.  Paulina Evans MD, Emergency Medicine.   [AL]      ED Course User Index  [AL] Cheyenne Dominguez MD     LABORATORY RESULTS:  Labs Reviewed   COVID-19 RAPID TEST - Abnormal; Notable for the following components:       Result Value    COVID-19 rapid test Detected (*)     All other components within normal limits   CBC WITH AUTOMATED DIFF - Abnormal; Notable for the following components:    HGB 10.5 (*)     HCT 34.7 (*)     MCV 70.0 (*)     MCH 21.2 (*)     RDW 18.0 (*)     NEUTROPHILS 78 (*)     LYMPHOCYTES 7 (*)     ABS. LYMPHOCYTES 0.3 (*)     All other components within normal limits   METABOLIC PANEL, COMPREHENSIVE - Abnormal; Notable for the following components:    Sodium 134 (*)     Glucose 123 (*)     All other components within normal limits   TROPONIN-HIGH SENSITIVITY   MAGNESIUM       IMAGING RESULTS:  XR CHEST PA LAT   Final Result    impression: No acute changes. MEDICATIONS GIVEN:  Medications   ondansetron (ZOFRAN) injection 4 mg (4 mg IntraVENous Given 7/31/22 1319)     Differential diagnosis: COVID-19, viral illness, ACS, noncardiac chest pain    ED physician interpretation of imaging: Chest x-ray without focal pneumonia  ED physician interpretation of EKG: No STEMI. See my interpretation EKG in ED course above. ED physician interpretation of laboratory results: Lab work without critical values. Hemoglobin at baseline. COVID test positive. Mild hyperglycemia without DKA. Troponin within normal limits. Doubt ACS    MDM: Patient is a 79-year-old female presenting with viral prodrome found to have COVID-19 likely causing all of her symptoms. However based on her age and reported chest pain ACS work-up was conducted with no concerning findings. Nausea improved with Zofran. Patient offered Paxlovid but declined and stated she would rather doubt likely the last time. No hypoxia or other concerning features of her COVID infection at this time. Further personalized recommendations for outpatient care as below. Key discharge instructions and summary of care: You presented to the ED with symptoms of signs and symptoms of COVID. Your COVID test was positive. This is likely driving all of your symptoms. Take Tylenol Motrin for fever and discomfort/myalgias. Zofran for nausea. Patient is stable for discharge. All available radiology and laboratory results have been reviewed with patient and/or available family. Patient and/or family verbally conveyed their understanding and agreement of the patient's signs, symptoms, diagnosis, treatment and prognosis and additionally agree to follow-up as recommended in the discharge instructions or to return to the Emergency Department should their condition change or worsen prior to their follow-up appointment. All questions have been answered and patient and/or available family express understanding. IMPRESSION:  1. COVID-19        DISPOSITION: Discharged     Alexi Cotto MD      Procedures

## 2022-07-31 NOTE — ED TRIAGE NOTES
Pt assisted to treatment area she states that for the past couple of days she has had cough with a headache, this morning she woke with chills, headache worse, body aches, chest pain with a feeling that it is running fast as well as left arm pain. She is feeling SOB. She is feeling very chilled as well.   She took her normal meds but nothing for her symptoms

## 2022-07-31 NOTE — Clinical Note
[address] EMERGENCY DEPT  [address] [address]  [phone]    Work/School Note    Date: 7/31/2022     To Whom It May concern:    [address] was evaluated by the following provider(s):  Attending Provider: Shabana Denis MD.   [address] virus is suspected. Per the CDC guidelines we recommend home isolation until the following conditions are all met:    1. At least five days have passed since symptoms first appeared and/or had a close exposure,   2. After home isolation for five days, wearing a mask around others for the next five days,  3. At least 24 have passed since last fever without the use of fever-reducing medications and  4. Symptoms (eg cough, shortness of breath) have improved      Sincerely,          Alexi Grant MD

## 2022-08-01 ENCOUNTER — PATIENT OUTREACH (OUTPATIENT)
Dept: CASE MANAGEMENT | Age: 62
End: 2022-08-01

## 2022-08-01 LAB
ATRIAL RATE: 102 BPM
CALCULATED P AXIS, ECG09: -19 DEGREES
CALCULATED R AXIS, ECG10: -29 DEGREES
CALCULATED T AXIS, ECG11: 146 DEGREES
DIAGNOSIS, 93000: NORMAL
P-R INTERVAL, ECG05: 192 MS
Q-T INTERVAL, ECG07: 336 MS
QRS DURATION, ECG06: 114 MS
QTC CALCULATION (BEZET), ECG08: 437 MS
VENTRICULAR RATE, ECG03: 102 BPM

## 2022-08-01 NOTE — PROGRESS NOTES
8/1/2022  11:29 AM    Patient contacted regarding COVID-19 diagnosis. Discussed COVID-19 related testing which was available at this time. Test results were positive. Patient informed of results, if available? No; unable to reach patient. Ambulatory Care Manager contacted the patient by telephone to perform post discharge assessment. ACM was unable to reach the patient by telephone today; lvm requesting a return phone call to this ACM. Call within 2 business days of discharge:  Yes

## 2022-08-02 NOTE — PROGRESS NOTES
8/2/2022  1:13 PM    Second patient outreach attempt by this ACM to perform post-discharge assessment. ACM was unable to reach the patient by telephone today; lvm requesting a return phone call to this ACM. Per chart review, [employer] account is not active; unable to send COVID-19 follow-up and resources to patient via [address]

## 2023-12-07 ENCOUNTER — OFFICE VISIT (OUTPATIENT)
Age: 63
End: 2023-12-07

## 2023-12-07 ENCOUNTER — HOSPITAL ENCOUNTER (EMERGENCY)
Facility: HOSPITAL | Age: 63
Discharge: HOME | End: 2023-12-08
Attending: EMERGENCY MEDICINE
Payer: MEDICARE

## 2023-12-07 ENCOUNTER — APPOINTMENT (OUTPATIENT)
Facility: HOSPITAL | Age: 63
End: 2023-12-07
Payer: MEDICARE

## 2023-12-07 VITALS
TEMPERATURE: 98.3 F | RESPIRATION RATE: 22 BRPM | HEART RATE: 83 BPM | SYSTOLIC BLOOD PRESSURE: 132 MMHG | OXYGEN SATURATION: 98 % | HEIGHT: 66 IN | WEIGHT: 207 LBS | BODY MASS INDEX: 33.27 KG/M2 | DIASTOLIC BLOOD PRESSURE: 88 MMHG

## 2023-12-07 DIAGNOSIS — S43.025A CLOSED POSTERIOR DISLOCATION OF LEFT SHOULDER, INITIAL ENCOUNTER: ICD-10-CM
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
## 2023-12-07 DIAGNOSIS — M19.012 PRIMARY OSTEOARTHRITIS OF LEFT SHOULDER: ICD-10-CM

## 2023-12-07 DIAGNOSIS — M19.90 ARTHRITIS: Primary | ICD-10-CM

## 2023-12-07 DIAGNOSIS — S42.215A CLOSED NONDISPLACED FRACTURE OF SURGICAL NECK OF LEFT HUMERUS, UNSPECIFIED FRACTURE MORPHOLOGY, INITIAL ENCOUNTER: ICD-10-CM

## 2023-12-07 DIAGNOSIS — S49.92XA INJURY OF LEFT SHOULDER AND UPPER ARM, INITIAL ENCOUNTER: Primary | ICD-10-CM

## 2023-12-07 PROCEDURE — 6370000000 HC RX 637 (ALT 250 FOR IP): Performed by: EMERGENCY MEDICINE

## 2023-12-07 PROCEDURE — 70450 CT HEAD/BRAIN W/O DYE: CPT

## 2023-12-07 PROCEDURE — 6370000000 HC RX 637 (ALT 250 FOR IP): Performed by: STUDENT IN AN ORGANIZED HEALTH CARE EDUCATION/TRAINING PROGRAM

## 2023-12-07 PROCEDURE — 73200 CT UPPER EXTREMITY W/O DYE: CPT

## 2023-12-07 PROCEDURE — 99284 EMERGENCY DEPT VISIT MOD MDM: CPT

## 2023-12-07 RX ORDER — OXYCODONE HYDROCHLORIDE 5 MG/1
5 TABLET ORAL
Status: COMPLETED | OUTPATIENT
Start: 2023-12-07 | End: 2023-12-07

## 2023-12-07 RX ORDER — LIDOCAINE 4 G/G
1 PATCH TOPICAL DAILY
Qty: 15 PATCH | Refills: 0 | Status: SHIPPED | OUTPATIENT
Start: 2023-12-07 | End: 2023-12-22

## 2023-12-07 RX ORDER — KETOROLAC TROMETHAMINE 30 MG/ML
60 INJECTION, SOLUTION INTRAMUSCULAR; INTRAVENOUS ONCE
Status: COMPLETED | OUTPATIENT
Start: 2023-12-07 | End: 2023-12-07

## 2023-12-07 RX ORDER — LIDOCAINE 4 G/G
1 PATCH TOPICAL
Status: DISCONTINUED | OUTPATIENT
Start: 2023-12-07 | End: 2023-12-08 | Stop reason: HOSPADM

## 2023-12-07 RX ADMIN — OXYCODONE 5 MG: 5 TABLET ORAL at 23:50

## 2023-12-07 RX ADMIN — KETOROLAC TROMETHAMINE 60 MG: 30 INJECTION, SOLUTION INTRAMUSCULAR; INTRAVENOUS at 16:15

## 2023-12-07 ASSESSMENT — ENCOUNTER SYMPTOMS
VOMITING: 0
WHEEZING: 0
SHORTNESS OF BREATH: 0
NAUSEA: 1
SHORTNESS OF BREATH: 0
EYE PAIN: 0
PHOTOPHOBIA: 0
ABDOMINAL PAIN: 0
COUGH: 0
CHEST TIGHTNESS: 0
COUGH: 0

## 2023-12-07 ASSESSMENT — PAIN DESCRIPTION - ORIENTATION: ORIENTATION: LEFT

## 2023-12-07 ASSESSMENT — PAIN DESCRIPTION - LOCATION: LOCATION: SHOULDER

## 2023-12-07 ASSESSMENT — PAIN DESCRIPTION - DESCRIPTORS: DESCRIPTORS: SHOOTING;SHARP

## 2023-12-07 ASSESSMENT — PAIN DESCRIPTION - FREQUENCY: FREQUENCY: CONTINUOUS

## 2023-12-07 ASSESSMENT — PAIN DESCRIPTION - ONSET: ONSET: SUDDEN

## 2023-12-07 ASSESSMENT — PAIN SCALES - GENERAL: PAINLEVEL_OUTOF10: 10

## 2023-12-07 NOTE — PROGRESS NOTES
[address] (:  1960) is a 58 y.o. female,New patient, here for evaluation of the following chief complaint(s):  Fall (Farzana Ha hit head on cabinet, L shoulder, and arm feels like it's dangling. No LOC, some nausea this morning after fall, no vision changes.)      ASSESSMENT/PLAN:       Mobic and Tylenol prn. Sling left shoulder. Ice therapy. Monitor for worsening symptoms, poor circulation, s/sx head injury. Discussed Ortho vs ED f/u as patient would like to f/u with her Ortho but will wait till AM to go to Ortho or ED; ED overnight if needed. Follow up in ED or with Orthopedics asap. SUBJECTIVE/OBJECTIVE:  HPI     58 y.o. female presents with symptoms of left shoulder injury. Fell 3am after going around corner and landed on left arm on hard-wood floor. Nausea after falling like wanted to throw. Hit head on corner of cabinet. Nausea immediately after. No vomiting or headache. No blurred vision, dizziness, loc. Right-handed. Pain left shoulder and feels like dragging arm. Review of Systems   Constitutional:  Negative for chills, diaphoresis, fatigue and fever. Eyes:  Negative for photophobia, pain and visual disturbance. Respiratory:  Negative for cough, chest tightness, shortness of breath and wheezing. Cardiovascular:  Negative for chest pain and palpitations. Gastrointestinal:  Positive for nausea. Negative for vomiting. Musculoskeletal:  Positive for arthralgias and joint swelling. Negative for back pain, myalgias and neck pain. Neurological:  Positive for weakness (left shoulder). Negative for dizziness, tremors, seizures, syncope, facial asymmetry, speech difficulty, light-headedness, numbness and headaches. Hematological:  Bruises/bleeds easily. Psychiatric/Behavioral:  Negative for behavioral problems, confusion and decreased concentration.           Vitals:    23 1545   BP: 132/88   Site: Right Upper Arm   Position: Sitting   Cuff Size: Large Adult

## 2023-12-08 VITALS
OXYGEN SATURATION: 95 % | HEART RATE: 66 BPM | RESPIRATION RATE: 18 BRPM | TEMPERATURE: 97.7 F | SYSTOLIC BLOOD PRESSURE: 134 MMHG | DIASTOLIC BLOOD PRESSURE: 90 MMHG

## 2023-12-08 NOTE — ED PROVIDER NOTES
ALBUTEROL SULFATE HFA (PROVENTIL;VENTOLIN;PROAIR) 108 (90 BASE) MCG/ACT INHALER    Inhale 2 puffs into the lungs every 4 hours as needed    AMLODIPINE (NORVASC) 5 MG TABLET    Take 1 tablet by mouth daily    DICLOFENAC SODIUM (VOLTAREN) 1 % GEL    Apply 2 g topically 4 times daily    FLUOXETINE (PROZAC) 20 MG CAPSULE    Take 1 capsule by mouth daily    HYDROXYZINE PAMOATE (VISTARIL) 25 MG CAPSULE    Take 1 capsule by mouth 3 times daily as needed    KETOCONAZOLE (NIZORAL) 2 % SHAMPOO    Apply to wet scalp, lather, leave on 3 to 5 minutes, and rinse. LISINOPRIL-HYDROCHLOROTHIAZIDE (PRINZIDE;ZESTORETIC) 20-12.5 MG PER TABLET    TAKE ONE TABLET BY MOUTH EVERY DAY FOR HYPERTENSION    MELOXICAM (MOBIC) 15 MG TABLET    Take 1 tablet by mouth daily    ONDANSETRON (ZOFRAN-ODT) 4 MG DISINTEGRATING TABLET    Take 1 tablet by mouth every 8 hours as needed       ALLERGIES     Patient has no known allergies.     FAMILY HISTORY       Family History   Problem Relation Age of Onset    Stroke Other     Colon Cancer Father     Hypertension Mother     Stroke Maternal Grandmother     Colon Cancer Paternal Grandfather     Colon Cancer Paternal Uncle           SOCIAL HISTORY       Social History     Socioeconomic History    Marital status: Single     Spouse name: None    Number of children: None    Years of education: None    Highest education level: None   Tobacco Use    Smoking status: Some Days     Packs/day: 1     Types: Cigarettes     Last attempt to quit: 2014     Years since quittin.2    Smokeless tobacco: Never    Tobacco comments:     Quit smoking: one cigarette per day when pt smokes   Vaping Use    Vaping Use: Never used   Substance and Sexual Activity    Alcohol use: No     Alcohol/week: 0.0 standard drinks of alcohol    Drug use: No       SCREENINGS         Merry Coma Scale  Eye Opening: Spontaneous  Best Verbal Response: Oriented  Best Motor Response: Obeys commands  Bethesda Coma Scale Score: 15 TempSrc: Oral    SpO2: 94% 97%         Medical Decision Making  67-year-old female with history of arthritis presents with complaints of left shoulder pain after trip and fall this morning and then advised to come to the ER for a CT of her left shoulder after x-ray showed severe arthritis. Patient is well-appearing, afebrile, nontoxic, hemodynamically stable, no respiratory distress, clear to auscultation bilaterally, normal room oxygen saturation. Plan-pain control, shoulder and head CT. Amount and/or Complexity of Data Reviewed  Radiology: ordered. Risk  OTC drugs. REASSESSMENT      11:27 PM  Discussed results with patient. Agreeable with plan for pain control and follow-up with her orthopedist, Dr. Bobbie Kehr. Denies cough fever, shortness of breath. FINAL IMPRESSION      1. Arthritis    2. Fall, initial encounter    3.  Closed nondisplaced fracture of surgical neck of left humerus, unspecified fracture morphology, initial encounter          DISPOSITION/PLAN   DISPOSITION Decision To Discharge 12/07/2023 11:27:11 PM          (Please note that portions of this note were completed with a voice recognition program.  Efforts were made to edit the dictations but occasionally words are mis-transcribed.)    Matt Doty MD (electronically signed)  Attending Emergency Physician            Felice Sandoval MD  12/07/23 3077       Felice Sandoval MD  12/07/23 8100

## 2023-12-08 NOTE — ED NOTES
Approached by nurses patient having tremendous pain trying to apply sling. Requesting temporary relief with analgesics. Reviewed chart. Has a ride home. Will give one oxycodone.       Marcelino Kyle DO  12/07/23 4190

## 2023-12-08 NOTE — ED TRIAGE NOTES
Pt ambulates to treatment area slowly. Pt states that around 0330 this morning she had gotten up to go to the bathroom and fell. Pt states that she did hit her head and fell on the left arm. Pt states that she scooted and tried to stand up but had a hard time. Pt went to urgent care first and they did xrays but sent pt here to have a CT done.

## 2023-12-08 NOTE — ED NOTES
Pt fitted for a sling. Pt also given pain medicine per MD order. Pt discharged and put in wheelchair to exit facility. RN explained to pt to call ortho to be evaluated and treated. RN also explained to pt the importance of rest and pain management. Pain understands. Pt discharged with niece.      Moreno Pope RN  [id number]/63 [id number]

## 2023-12-10 ASSESSMENT — ENCOUNTER SYMPTOMS: BACK PAIN: 0

## 2025-01-15 ENCOUNTER — APPOINTMENT (OUTPATIENT)
Facility: HOSPITAL | Age: 65
End: 2025-01-15
Payer: MEDICARE

## 2025-01-15 ENCOUNTER — HOSPITAL ENCOUNTER (EMERGENCY)
Facility: HOSPITAL | Age: 65
Discharge: HOME | End: 2025-01-15
Attending: STUDENT IN AN ORGANIZED HEALTH CARE EDUCATION/TRAINING PROGRAM
Payer: MEDICARE

## 2025-01-15 VITALS
BODY MASS INDEX: 30.53 KG/M2 | DIASTOLIC BLOOD PRESSURE: 85 MMHG | TEMPERATURE: 98.6 F | HEART RATE: 89 BPM | RESPIRATION RATE: 23 BRPM | OXYGEN SATURATION: 91 % | SYSTOLIC BLOOD PRESSURE: 153 MMHG | WEIGHT: 190 LBS | HEIGHT: 66 IN

## 2025-01-15 DIAGNOSIS — J18.9 PNEUMONIA OF BOTH LOWER LOBES DUE TO INFECTIOUS ORGANISM: Primary | ICD-10-CM

## 2025-01-15 DIAGNOSIS — J45.21 MILD INTERMITTENT ASTHMA WITH EXACERBATION: ICD-10-CM

## 2025-01-15 LAB
ALBUMIN SERPL-MCNC: 3 G/DL (ref 3.5–5)
ALBUMIN/GLOB SERPL: 0.7 (ref 1.1–2.2)
ALP SERPL-CCNC: 88 U/L (ref 45–117)
ALT SERPL-CCNC: 18 U/L (ref 12–78)
ANION GAP SERPL CALC-SCNC: 8 MMOL/L (ref 2–12)
AST SERPL-CCNC: 17 U/L (ref 15–37)
BASOPHILS # BLD: 0.02 K/UL (ref 0–0.1)
BASOPHILS NFR BLD: 0.3 % (ref 0–1)
BILIRUB SERPL-MCNC: 0.7 MG/DL (ref 0.2–1)
BUN SERPL-MCNC: 11 MG/DL (ref 6–20)
BUN/CREAT SERPL: 18 (ref 12–20)
CALCIUM SERPL-MCNC: 8.7 MG/DL (ref 8.5–10.1)
CHLORIDE SERPL-SCNC: 96 MMOL/L (ref 97–108)
CO2 SERPL-SCNC: 29 MMOL/L (ref 21–32)
CREAT SERPL-MCNC: 0.62 MG/DL (ref 0.55–1.02)
DIFFERENTIAL METHOD BLD: ABNORMAL
EKG ATRIAL RATE: 75 BPM
EKG DIAGNOSIS: NORMAL
EKG P AXIS: -1 DEGREES
EKG P-R INTERVAL: 166 MS
EKG Q-T INTERVAL: 394 MS
EKG QRS DURATION: 118 MS
EKG QTC CALCULATION (BAZETT): 439 MS
EKG R AXIS: -37 DEGREES
EKG T AXIS: 83 DEGREES
EKG VENTRICULAR RATE: 75 BPM
EOSINOPHIL # BLD: 0.08 K/UL (ref 0–0.4)
EOSINOPHIL NFR BLD: 1.1 % (ref 0–7)
ERYTHROCYTE [DISTWIDTH] IN BLOOD BY AUTOMATED COUNT: 14.3 % (ref 11.5–14.5)
GLOBULIN SER CALC-MCNC: 4.1 G/DL (ref 2–4)
GLUCOSE SERPL-MCNC: 117 MG/DL (ref 65–100)
HCT VFR BLD AUTO: 39.7 % (ref 35–47)
HGB BLD-MCNC: 13.2 G/DL (ref 11.5–16)
IMM GRANULOCYTES # BLD AUTO: 0.06 K/UL (ref 0–0.04)
IMM GRANULOCYTES NFR BLD AUTO: 0.8 % (ref 0–0.5)
LYMPHOCYTES # BLD: 1.24 K/UL (ref 0.8–3.5)
LYMPHOCYTES NFR BLD: 16.6 % (ref 12–49)
MCH RBC QN AUTO: 25.8 PG (ref 26–34)
MCHC RBC AUTO-ENTMCNC: 33.2 G/DL (ref 30–36.5)
MCV RBC AUTO: 77.7 FL (ref 80–99)
MONOCYTES # BLD: 0.66 K/UL (ref 0–1)
MONOCYTES NFR BLD: 8.8 % (ref 5–13)
NEUTS SEG # BLD: 5.42 K/UL (ref 1.8–8)
NEUTS SEG NFR BLD: 72.4 % (ref 32–75)
NRBC # BLD: 0 K/UL (ref 0–0.01)
NRBC BLD-RTO: 0 PER 100 WBC
PLATELET # BLD AUTO: 182 K/UL (ref 150–400)
PMV BLD AUTO: 9.3 FL (ref 8.9–12.9)
POTASSIUM SERPL-SCNC: 3.5 MMOL/L (ref 3.5–5.1)
PROT SERPL-MCNC: 7.1 G/DL (ref 6.4–8.2)
RBC # BLD AUTO: 5.11 M/UL (ref 3.8–5.2)
SODIUM SERPL-SCNC: 133 MMOL/L (ref 136–145)
TROPONIN I SERPL HS-MCNC: 9 NG/L (ref 0–51)
WBC # BLD AUTO: 7.5 K/UL (ref 3.6–11)

## 2025-01-15 PROCEDURE — 6370000000 HC RX 637 (ALT 250 FOR IP): Performed by: STUDENT IN AN ORGANIZED HEALTH CARE EDUCATION/TRAINING PROGRAM

## 2025-01-15 PROCEDURE — 71045 X-RAY EXAM CHEST 1 VIEW: CPT

## 2025-01-15 PROCEDURE — 80053 COMPREHEN METABOLIC PANEL: CPT

## 2025-01-15 PROCEDURE — 84484 ASSAY OF TROPONIN QUANT: CPT

## 2025-01-15 PROCEDURE — 99285 EMERGENCY DEPT VISIT HI MDM: CPT

## 2025-01-15 PROCEDURE — 36415 COLL VENOUS BLD VENIPUNCTURE: CPT

## 2025-01-15 PROCEDURE — 85025 COMPLETE CBC W/AUTO DIFF WBC: CPT

## 2025-01-15 PROCEDURE — 93010 ELECTROCARDIOGRAM REPORT: CPT | Performed by: STUDENT IN AN ORGANIZED HEALTH CARE EDUCATION/TRAINING PROGRAM

## 2025-01-15 PROCEDURE — 6360000002 HC RX W HCPCS: Performed by: STUDENT IN AN ORGANIZED HEALTH CARE EDUCATION/TRAINING PROGRAM

## 2025-01-15 PROCEDURE — 93005 ELECTROCARDIOGRAM TRACING: CPT | Performed by: STUDENT IN AN ORGANIZED HEALTH CARE EDUCATION/TRAINING PROGRAM

## 2025-01-15 RX ORDER — DOXYCYCLINE HYCLATE 100 MG
100 TABLET ORAL 2 TIMES DAILY
Qty: 14 TABLET | Refills: 0 | Status: SHIPPED | OUTPATIENT
Start: 2025-01-15 | End: 2025-01-22

## 2025-01-15 RX ORDER — PREDNISONE 20 MG/1
40 TABLET ORAL DAILY
Qty: 20 TABLET | Refills: 0 | Status: SHIPPED | OUTPATIENT
Start: 2025-01-15 | End: 2025-01-25

## 2025-01-15 RX ORDER — GUAIFENESIN 200 MG/10ML
200 LIQUID ORAL 3 TIMES DAILY PRN
Qty: 236 ML | Refills: 0 | Status: SHIPPED | OUTPATIENT
Start: 2025-01-15

## 2025-01-15 RX ORDER — AZITHROMYCIN 250 MG/1
TABLET, FILM COATED ORAL
Qty: 6 TABLET | Refills: 0 | Status: SHIPPED | OUTPATIENT
Start: 2025-01-15 | End: 2025-01-25

## 2025-01-15 RX ADMIN — IPRATROPIUM BROMIDE AND ALBUTEROL SULFATE 1 DOSE: 2.5; .5 SOLUTION RESPIRATORY (INHALATION) at 10:30

## 2025-01-15 ASSESSMENT — PAIN - FUNCTIONAL ASSESSMENT: PAIN_FUNCTIONAL_ASSESSMENT: PREVENTS OR INTERFERES SOME ACTIVE ACTIVITIES AND ADLS

## 2025-01-15 ASSESSMENT — PAIN SCALES - GENERAL: PAINLEVEL_OUTOF10: 9

## 2025-01-15 ASSESSMENT — PAIN DESCRIPTION - DESCRIPTORS: DESCRIPTORS: TIGHTNESS

## 2025-01-15 ASSESSMENT — PAIN DESCRIPTION - ORIENTATION: ORIENTATION: MID

## 2025-01-15 ASSESSMENT — PAIN DESCRIPTION - LOCATION: LOCATION: CHEST

## 2025-01-15 ASSESSMENT — LIFESTYLE VARIABLES: HOW OFTEN DO YOU HAVE A DRINK CONTAINING ALCOHOL: NEVER

## 2025-01-15 NOTE — DISCHARGE INSTRUCTIONS
Your x-ray showed pneumonia or lung infection.  Take the prescribed antibiotics for this.  Additionally take the prescribed steroid for your asthma as well as the prescribed cough medicine as needed.  Return to the ER if symptoms change or worsen or you have any other concerns.  Otherwise follow-up with your primary doctor soon as possible for further care.

## 2025-01-15 NOTE — ED PROVIDER NOTES
her albuterol inhaler at home without significant relief.  Denies any known fevers or chills.  Denies chest pain, hemoptysis leg pain or swelling.  Reports similar symptoms in the past during asthma exacerbations.  No prior admissions for asthma.    She arrives in no acute distress with reassuring vital signs, satting 95% on room air.  She has bilateral wheezing on exam consistent with reactive airway disease.    Differential includes atypical ACS, pneumonia, asthma exacerbation.  Doubt PE in absence of tachycardia, hypoxia, hypertension, pleuritic pain or other concerning features. Wells score 0.    Plan for [facility], x-ray, labs and reassessment.    Amount and/or Complexity of Data Reviewed  Independent Historian: caregiver  External Data Reviewed: notes.  Labs: ordered.  Radiology: ordered. Decision-making details documented in ED Course.  ECG/medicine tests: ordered. Decision-making details documented in ED Course.    Risk  OTC drugs.  Prescription drug management.        REASSESSMENT     ED Course as of 01/15/25 1409   Wed Talat 15, 2025   1026 XR CHEST PORTABLE  IMPRESSION:     Mild, patchy bilateral airspace disease likely represents pneumonia.   [AS]   1034 EKG 12 Lead  EKG interpreted by me.  Normal sinus rhythm with a rate of 75.  Left axis deviation with likely LVH.  No STEMI. [AS]   1138 Reassessed at this time, feeling significantly better.  Satting 95% on room air.  No additional wheezing heard on lung exam.  Will discharge [AS]      ED Course User Index  [AS] Ralph Cox MD       CONSULTS:  None    PROCEDURES:  Procedures    FINAL IMPRESSION      1. Pneumonia of both lower lobes due to infectious organism    2. Mild intermittent asthma with exacerbation          DISPOSITION/PLAN   DISPOSITION Decision To Discharge 01/15/2025 11:38:48 AM    (Please note that portions of this note were completed with a voice recognition program.  Efforts were made to edit the dictations but occasionally words are

## 2025-01-15 NOTE — ED NOTES
The patient was discharged home by provider in stable condition with family. The patient is alert and oriented, in no respiratory distress. The patient's diagnosis, condition and treatment were explained. The patient expressed understanding and denies any questions or concerns at this time. Patient leaves treatment area via wheelchair with all personal belongings.

## 2025-01-15 NOTE — ED TRIAGE NOTES
Patient presents to treatment area via wheelchair. Patient complains of increasing shortness of breath, cough, sinus congestion, that began over two weeks ago and has been worsening since onset. States she has had perceived fevers intermittently. Tachypnic with exertion. Speaking full sentences at rest.